# Patient Record
Sex: FEMALE | Race: WHITE | NOT HISPANIC OR LATINO | Employment: FULL TIME | URBAN - METROPOLITAN AREA
[De-identification: names, ages, dates, MRNs, and addresses within clinical notes are randomized per-mention and may not be internally consistent; named-entity substitution may affect disease eponyms.]

---

## 2019-08-07 ENCOUNTER — HISTORICAL (OUTPATIENT)
Dept: RADIOLOGY | Facility: HOSPITAL | Age: 59
End: 2019-08-07

## 2019-08-20 ENCOUNTER — HISTORICAL (OUTPATIENT)
Dept: RADIOLOGY | Facility: HOSPITAL | Age: 59
End: 2019-08-20

## 2022-08-07 ENCOUNTER — HOSPITAL ENCOUNTER (INPATIENT)
Facility: HOSPITAL | Age: 62
LOS: 3 days | Discharge: NURSING, REHAB OR LONG-TERM CARE | DRG: 494 | End: 2022-08-10
Attending: STUDENT IN AN ORGANIZED HEALTH CARE EDUCATION/TRAINING PROGRAM | Admitting: INTERNAL MEDICINE
Payer: MEDICAID

## 2022-08-07 DIAGNOSIS — S00.81XA FOREHEAD ABRASION, INITIAL ENCOUNTER: ICD-10-CM

## 2022-08-07 DIAGNOSIS — Z01.811 PRE-OP CHEST EXAM: ICD-10-CM
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
## 2022-08-07 DIAGNOSIS — M25.571 RIGHT ANKLE PAIN: ICD-10-CM

## 2022-08-07 DIAGNOSIS — S80.211A ABRASION OF RIGHT KNEE, INITIAL ENCOUNTER: ICD-10-CM

## 2022-08-07 DIAGNOSIS — F10.929 ALCOHOLIC INTOXICATION WITH COMPLICATION: ICD-10-CM

## 2022-08-07 DIAGNOSIS — S82.851A CLOSED TRIMALLEOLAR FRACTURE OF RIGHT ANKLE, INITIAL ENCOUNTER: Primary | ICD-10-CM

## 2022-08-07 PROBLEM — I10 HYPERTENSION: Status: ACTIVE | Noted: 2022-08-07

## 2022-08-07 PROBLEM — E78.5 HYPERLIPIDEMIA: Status: ACTIVE | Noted: 2022-08-07

## 2022-08-07 PROBLEM — I20.9 ANGINA PECTORIS: Status: ACTIVE | Noted: 2022-08-07

## 2022-08-07 LAB
ALBUMIN SERPL-MCNC: 4.1 GM/DL (ref 3.4–4.8)
ALBUMIN/GLOB SERPL: 1.3 RATIO (ref 1.1–2)
ALP SERPL-CCNC: 89 UNIT/L (ref 40–150)
ALT SERPL-CCNC: 39 UNIT/L (ref 0–55)
APTT PPP: 24.1 SECONDS (ref 23.2–33.7)
AST SERPL-CCNC: 22 UNIT/L (ref 5–34)
BASOPHILS # BLD AUTO: 0.04 X10(3)/MCL (ref 0–0.2)
BASOPHILS NFR BLD AUTO: 0.4 %
BILIRUBIN DIRECT+TOT PNL SERPL-MCNC: 0.4 MG/DL
BUN SERPL-MCNC: 11 MG/DL (ref 9.8–20.1)
CALCIUM SERPL-MCNC: 10.3 MG/DL (ref 8.4–10.2)
CHLORIDE SERPL-SCNC: 103 MMOL/L (ref 98–107)
CO2 SERPL-SCNC: 21 MMOL/L (ref 23–31)
CREAT SERPL-MCNC: 0.82 MG/DL (ref 0.55–1.02)
EOSINOPHIL # BLD AUTO: 0.24 X10(3)/MCL (ref 0–0.9)
EOSINOPHIL NFR BLD AUTO: 2.6 %
ERYTHROCYTE [DISTWIDTH] IN BLOOD BY AUTOMATED COUNT: 13.6 % (ref 11.5–17)
ETHANOL SERPL-MCNC: 283 MG/DL
GLOBULIN SER-MCNC: 3.1 GM/DL (ref 2.4–3.5)
GLUCOSE SERPL-MCNC: 146 MG/DL (ref 82–115)
HCT VFR BLD AUTO: 39 % (ref 37–47)
HGB BLD-MCNC: 12.8 GM/DL (ref 12–16)
IMM GRANULOCYTES # BLD AUTO: 0.12 X10(3)/MCL (ref 0–0.04)
IMM GRANULOCYTES NFR BLD AUTO: 1.3 %
INR BLD: 1.08 (ref 0–1.3)
LYMPHOCYTES # BLD AUTO: 3.72 X10(3)/MCL (ref 0.6–4.6)
LYMPHOCYTES NFR BLD AUTO: 39.6 %
MCH RBC QN AUTO: 31 PG (ref 27–31)
MCHC RBC AUTO-ENTMCNC: 32.8 MG/DL (ref 33–36)
MCV RBC AUTO: 94.4 FL (ref 80–94)
MONOCYTES # BLD AUTO: 0.66 X10(3)/MCL (ref 0.1–1.3)
MONOCYTES NFR BLD AUTO: 7 %
NEUTROPHILS # BLD AUTO: 4.6 X10(3)/MCL (ref 2.1–9.2)
NEUTROPHILS NFR BLD AUTO: 49.1 %
PLATELET # BLD AUTO: 336 X10(3)/MCL (ref 130–400)
PMV BLD AUTO: 8.5 FL (ref 7.4–10.4)
POTASSIUM SERPL-SCNC: 4.2 MMOL/L (ref 3.5–5.1)
PROT SERPL-MCNC: 7.2 GM/DL (ref 5.8–7.6)
PROTHROMBIN TIME: 13.9 SECONDS (ref 12.5–14.5)
RBC # BLD AUTO: 4.13 X10(6)/MCL (ref 4.2–5.4)
SARS-COV-2 RDRP RESP QL NAA+PROBE: NEGATIVE
SODIUM SERPL-SCNC: 136 MMOL/L (ref 136–145)
WBC # SPEC AUTO: 9.4 X10(3)/MCL (ref 4.5–11.5)

## 2022-08-07 PROCEDURE — 96376 TX/PRO/DX INJ SAME DRUG ADON: CPT

## 2022-08-07 PROCEDURE — 80053 COMPREHEN METABOLIC PANEL: CPT | Performed by: STUDENT IN AN ORGANIZED HEALTH CARE EDUCATION/TRAINING PROGRAM

## 2022-08-07 PROCEDURE — 36415 COLL VENOUS BLD VENIPUNCTURE: CPT | Performed by: STUDENT IN AN ORGANIZED HEALTH CARE EDUCATION/TRAINING PROGRAM

## 2022-08-07 PROCEDURE — 11000001 HC ACUTE MED/SURG PRIVATE ROOM

## 2022-08-07 PROCEDURE — 25000003 PHARM REV CODE 250: Performed by: INTERNAL MEDICINE

## 2022-08-07 PROCEDURE — 63600175 PHARM REV CODE 636 W HCPCS: Performed by: STUDENT IN AN ORGANIZED HEALTH CARE EDUCATION/TRAINING PROGRAM

## 2022-08-07 PROCEDURE — 96374 THER/PROPH/DIAG INJ IV PUSH: CPT

## 2022-08-07 PROCEDURE — 82077 ASSAY SPEC XCP UR&BREATH IA: CPT | Performed by: STUDENT IN AN ORGANIZED HEALTH CARE EDUCATION/TRAINING PROGRAM

## 2022-08-07 PROCEDURE — 85610 PROTHROMBIN TIME: CPT | Performed by: STUDENT IN AN ORGANIZED HEALTH CARE EDUCATION/TRAINING PROGRAM

## 2022-08-07 PROCEDURE — 87635 SARS-COV-2 COVID-19 AMP PRB: CPT | Performed by: STUDENT IN AN ORGANIZED HEALTH CARE EDUCATION/TRAINING PROGRAM

## 2022-08-07 PROCEDURE — 96375 TX/PRO/DX INJ NEW DRUG ADDON: CPT

## 2022-08-07 PROCEDURE — 85025 COMPLETE CBC W/AUTO DIFF WBC: CPT | Performed by: STUDENT IN AN ORGANIZED HEALTH CARE EDUCATION/TRAINING PROGRAM

## 2022-08-07 PROCEDURE — 94761 N-INVAS EAR/PLS OXIMETRY MLT: CPT

## 2022-08-07 PROCEDURE — 27000221 HC OXYGEN, UP TO 24 HOURS

## 2022-08-07 PROCEDURE — 99285 EMERGENCY DEPT VISIT HI MDM: CPT | Mod: 25

## 2022-08-07 PROCEDURE — A4216 STERILE WATER/SALINE, 10 ML: HCPCS | Performed by: INTERNAL MEDICINE

## 2022-08-07 PROCEDURE — 63600175 PHARM REV CODE 636 W HCPCS: Performed by: INTERNAL MEDICINE

## 2022-08-07 PROCEDURE — 29515 APPLICATION SHORT LEG SPLINT: CPT | Mod: RT

## 2022-08-07 PROCEDURE — 96361 HYDRATE IV INFUSION ADD-ON: CPT

## 2022-08-07 PROCEDURE — 63600175 PHARM REV CODE 636 W HCPCS: Performed by: SPECIALIST

## 2022-08-07 PROCEDURE — 85730 THROMBOPLASTIN TIME PARTIAL: CPT | Performed by: STUDENT IN AN ORGANIZED HEALTH CARE EDUCATION/TRAINING PROGRAM

## 2022-08-07 RX ORDER — ESCITALOPRAM OXALATE 20 MG/1
20 TABLET ORAL DAILY
COMMUNITY
Start: 2022-07-29

## 2022-08-07 RX ORDER — HYDROMORPHONE HYDROCHLORIDE 2 MG/ML
1 INJECTION, SOLUTION INTRAMUSCULAR; INTRAVENOUS; SUBCUTANEOUS
Status: DISCONTINUED | OUTPATIENT
Start: 2022-08-07 | End: 2022-08-10 | Stop reason: HOSPADM

## 2022-08-07 RX ORDER — MORPHINE SULFATE 4 MG/ML
INJECTION, SOLUTION INTRAMUSCULAR; INTRAVENOUS
Status: DISPENSED
Start: 2022-08-07 | End: 2022-08-07

## 2022-08-07 RX ORDER — ONDANSETRON 2 MG/ML
4 INJECTION INTRAMUSCULAR; INTRAVENOUS
Status: COMPLETED | OUTPATIENT
Start: 2022-08-07 | End: 2022-08-07

## 2022-08-07 RX ORDER — SODIUM CHLORIDE, SODIUM LACTATE, POTASSIUM CHLORIDE, CALCIUM CHLORIDE 600; 310; 30; 20 MG/100ML; MG/100ML; MG/100ML; MG/100ML
1000 INJECTION, SOLUTION INTRAVENOUS
Status: COMPLETED | OUTPATIENT
Start: 2022-08-07 | End: 2022-08-07

## 2022-08-07 RX ORDER — HYDROMORPHONE HYDROCHLORIDE 2 MG/ML
0.5 INJECTION, SOLUTION INTRAMUSCULAR; INTRAVENOUS; SUBCUTANEOUS
Status: COMPLETED | OUTPATIENT
Start: 2022-08-07 | End: 2022-08-07

## 2022-08-07 RX ORDER — LISINOPRIL 10 MG/1
10 TABLET ORAL DAILY
Status: DISCONTINUED | OUTPATIENT
Start: 2022-08-08 | End: 2022-08-10 | Stop reason: HOSPADM

## 2022-08-07 RX ORDER — DIPHENHYDRAMINE HCL 25 MG
50 TABLET ORAL NIGHTLY PRN
COMMUNITY

## 2022-08-07 RX ORDER — ATORVASTATIN CALCIUM 40 MG/1
40 TABLET, FILM COATED ORAL DAILY
Status: DISCONTINUED | OUTPATIENT
Start: 2022-08-08 | End: 2022-08-10 | Stop reason: HOSPADM

## 2022-08-07 RX ORDER — LISINOPRIL 10 MG/1
10 TABLET ORAL DAILY
COMMUNITY
Start: 2022-07-29

## 2022-08-07 RX ORDER — ESCITALOPRAM OXALATE 5 MG/1
20 TABLET ORAL DAILY
Status: DISCONTINUED | OUTPATIENT
Start: 2022-08-08 | End: 2022-08-10 | Stop reason: HOSPADM

## 2022-08-07 RX ORDER — ATORVASTATIN CALCIUM 40 MG/1
40 TABLET, FILM COATED ORAL DAILY
COMMUNITY
Start: 2022-07-29

## 2022-08-07 RX ORDER — PROCHLORPERAZINE EDISYLATE 5 MG/ML
10 INJECTION INTRAMUSCULAR; INTRAVENOUS
Status: COMPLETED | OUTPATIENT
Start: 2022-08-07 | End: 2022-08-07

## 2022-08-07 RX ORDER — PROPOFOL 10 MG/ML
100 VIAL (ML) INTRAVENOUS
Status: DISCONTINUED | OUTPATIENT
Start: 2022-08-07 | End: 2022-08-07

## 2022-08-07 RX ORDER — SODIUM CHLORIDE, SODIUM LACTATE, POTASSIUM CHLORIDE, CALCIUM CHLORIDE 600; 310; 30; 20 MG/100ML; MG/100ML; MG/100ML; MG/100ML
1000 INJECTION, SOLUTION INTRAVENOUS
Status: ACTIVE | OUTPATIENT
Start: 2022-08-07 | End: 2022-08-08

## 2022-08-07 RX ORDER — MORPHINE SULFATE 4 MG/ML
2 INJECTION, SOLUTION INTRAMUSCULAR; INTRAVENOUS
Status: DISCONTINUED | OUTPATIENT
Start: 2022-08-07 | End: 2022-08-07

## 2022-08-07 RX ORDER — MORPHINE SULFATE 4 MG/ML
4 INJECTION, SOLUTION INTRAMUSCULAR; INTRAVENOUS
Status: COMPLETED | OUTPATIENT
Start: 2022-08-07 | End: 2022-08-07

## 2022-08-07 RX ORDER — SODIUM CHLORIDE, SODIUM LACTATE, POTASSIUM CHLORIDE, CALCIUM CHLORIDE 600; 310; 30; 20 MG/100ML; MG/100ML; MG/100ML; MG/100ML
INJECTION, SOLUTION INTRAVENOUS CONTINUOUS
Status: DISCONTINUED | OUTPATIENT
Start: 2022-08-07 | End: 2022-08-09

## 2022-08-07 RX ORDER — ACETAMINOPHEN 325 MG/1
650 TABLET ORAL EVERY 8 HOURS PRN
Status: DISCONTINUED | OUTPATIENT
Start: 2022-08-07 | End: 2022-08-10 | Stop reason: HOSPADM

## 2022-08-07 RX ORDER — HYDROMORPHONE HYDROCHLORIDE 2 MG/ML
1 INJECTION, SOLUTION INTRAMUSCULAR; INTRAVENOUS; SUBCUTANEOUS EVERY 4 HOURS PRN
Status: DISCONTINUED | OUTPATIENT
Start: 2022-08-07 | End: 2022-08-10 | Stop reason: HOSPADM

## 2022-08-07 RX ORDER — DIPHENHYDRAMINE HCL 25 MG
25 CAPSULE ORAL NIGHTLY PRN
Status: DISCONTINUED | OUTPATIENT
Start: 2022-08-07 | End: 2022-08-10 | Stop reason: HOSPADM

## 2022-08-07 RX ORDER — SODIUM CHLORIDE 0.9 % (FLUSH) 0.9 %
10 SYRINGE (ML) INJECTION EVERY 8 HOURS
Status: DISCONTINUED | OUTPATIENT
Start: 2022-08-07 | End: 2022-08-10 | Stop reason: HOSPADM

## 2022-08-07 RX ORDER — KETOROLAC TROMETHAMINE 30 MG/ML
30 INJECTION, SOLUTION INTRAMUSCULAR; INTRAVENOUS EVERY 6 HOURS PRN
Status: DISPENSED | OUTPATIENT
Start: 2022-08-07 | End: 2022-08-10

## 2022-08-07 RX ORDER — HYDROMORPHONE HYDROCHLORIDE 2 MG/ML
1 INJECTION, SOLUTION INTRAMUSCULAR; INTRAVENOUS; SUBCUTANEOUS ONCE
Status: COMPLETED | OUTPATIENT
Start: 2022-08-07 | End: 2022-08-07

## 2022-08-07 RX ADMIN — SODIUM CHLORIDE, POTASSIUM CHLORIDE, SODIUM LACTATE AND CALCIUM CHLORIDE 1000 ML: 600; 310; 30; 20 INJECTION, SOLUTION INTRAVENOUS at 02:08

## 2022-08-07 RX ADMIN — PROCHLORPERAZINE EDISYLATE 10 MG: 5 INJECTION INTRAMUSCULAR; INTRAVENOUS at 10:08

## 2022-08-07 RX ADMIN — SODIUM CHLORIDE, PRESERVATIVE FREE 10 ML: 5 INJECTION INTRAVENOUS at 02:08

## 2022-08-07 RX ADMIN — MORPHINE SULFATE 4 MG: 4 INJECTION INTRAVENOUS at 02:08

## 2022-08-07 RX ADMIN — KETOROLAC TROMETHAMINE 30 MG: 30 INJECTION, SOLUTION INTRAMUSCULAR; INTRAVENOUS at 03:08

## 2022-08-07 RX ADMIN — ONDANSETRON 4 MG: 2 INJECTION INTRAMUSCULAR; INTRAVENOUS at 02:08

## 2022-08-07 RX ADMIN — HYDROMORPHONE HYDROCHLORIDE 1 MG: 2 INJECTION, SOLUTION INTRAMUSCULAR; INTRAVENOUS; SUBCUTANEOUS at 08:08

## 2022-08-07 RX ADMIN — HYDROMORPHONE HYDROCHLORIDE 1 MG: 2 INJECTION INTRAMUSCULAR; INTRAVENOUS; SUBCUTANEOUS at 05:08

## 2022-08-07 RX ADMIN — SODIUM CHLORIDE, POTASSIUM CHLORIDE, SODIUM LACTATE AND CALCIUM CHLORIDE 1000 ML: 600; 310; 30; 20 INJECTION, SOLUTION INTRAVENOUS at 03:08

## 2022-08-07 RX ADMIN — HYDROMORPHONE HYDROCHLORIDE 1 MG: 2 INJECTION INTRAMUSCULAR; INTRAVENOUS; SUBCUTANEOUS at 12:08

## 2022-08-07 RX ADMIN — DIPHENHYDRAMINE HYDROCHLORIDE 25 MG: 25 CAPSULE ORAL at 08:08

## 2022-08-07 RX ADMIN — HYDROMORPHONE HYDROCHLORIDE 0.5 MG: 2 INJECTION, SOLUTION INTRAMUSCULAR; INTRAVENOUS; SUBCUTANEOUS at 05:08

## 2022-08-07 RX ADMIN — HYDROMORPHONE HYDROCHLORIDE 1 MG: 2 INJECTION INTRAMUSCULAR; INTRAVENOUS; SUBCUTANEOUS at 08:08

## 2022-08-07 RX ADMIN — SODIUM CHLORIDE, POTASSIUM CHLORIDE, SODIUM LACTATE AND CALCIUM CHLORIDE: 600; 310; 30; 20 INJECTION, SOLUTION INTRAVENOUS at 08:08

## 2022-08-07 NOTE — HPI
63 yo female presents to the ED last night after sustaining a fall in which she did strike her head but no LOC and twisted her right ankle.  She was at the [employer] drinking Etoh.  On her way out she tripped and fell.  She was found to have a displaced trimalleolar fracture of the right ankle.  She was also intoxicated with a Etoh level of 283.  Pain is 9/10 in intensity with no relieving factors.  She denies any N/V/D/C.  No chest pain/SOB.  No fever/chills.  Orthopedics has been consulted and repair is scheduled for tomorrow.

## 2022-08-07 NOTE — ED NOTES
Pt entered ED via [facility] with reports of tripping and falling over curb tonight. Obvious deformity noted to R ankle with cassie splint in place. Ems reports giving 50mcg fentanyl in route. pts pain 10/10. Admits to etoh use tonight. Abrasion noted to L knee and above R eyebrow without bleeding. Denies LOC

## 2022-08-07 NOTE — CONSULTS
[facility] - Medical Surgical Unit  Orthopedics  Consult Note    Patient Name: Alicia Bhagat  MRN: [id number]  Admission Date: 8/7/2022  Hospital Length of Stay: 0 days  Attending Provider: Tavares Francisco MD  Primary Care Provider: Albaro Bonilla MD    Patient information was obtained from patient and ER records.     Consults  Subjective:     Principal Problem:Closed trimalleolar fracture of right ankle    Chief Complaint:   Chief Complaint   Patient presents with    Ankle Pain     States tripped and fell tonight. Obvious R ankle deformity with cassie splint in place. 50mcg fentanyl given in route. Abrasion to R knee and above R eye. Denies LOC        HPI: This is a 62-year-old female who injured her right ankle during a fall.  She states she was at [employer] listening to a band and was walking and fell with a twisting motion to the ankle.  She had immediate pain to the right ankle region and was unable to bear weight.  She was brought to the emergency department, examined and x-rayed and found to have a trimalleolar right ankle fracture.  Past medical history is positive for hypertension and hyperlipidemia.  She does have a history of having had some sort of back surgery in 2012.  She still occasionally has back pain.  She has an abrasion above her right eye but denies loss of consciousness during the fall.      Principal Problem:Closed trimalleolar fracture of right ankle    Principal Orthopedic Problem:  Right closed trimalleolar ankle fracture.     Interval History:  Patient has been admitted under the care of the hospitalist Dr. Lundy has been consulted for orthopedic intervention.    Review of patient's allergies indicates:   Allergen Reactions    Adhesive        Current Facility-Administered Medications   Medication    acetaminophen tablet 650 mg    HYDROmorphone (PF) injection 1 mg    ketorolac injection 30 mg    sodium chloride 0.9% flush 10 mL     Objective:     Vital Signs  "(Most Recent):  Temp: 97.8 °F (36.6 °C) (08/07/22 1200)  Pulse: 91 (08/07/22 1200)  Resp: (P) 18 (08/07/22 1254)  BP: 137/75 (08/07/22 1200)  SpO2: 98 % (08/07/22 1200) Vital Signs (24h Range):  Temp:  [97.8 °F (36.6 °C)-97.9 °F (36.6 °C)] 97.8 °F (36.6 °C)  Pulse:  [80-95] 91  Resp:  [12-20] (P) 18  SpO2:  [94 %-99 %] 98 %  BP: ()/() 137/75     Weight: 81.6 kg (180 lb)  Height: 5' 5" (165.1 cm)  Body mass index is 29.95 kg/m².      Intake/Output Summary (Last 24 hours) at 8/7/2022 1256  Last data filed at 8/7/2022 0330  Gross per 24 hour   Intake 1000 ml   Output --   Net 1000 ml       General    Vitals reviewed.  Constitutional: She is oriented to person, place, and time. She appears well-developed and well-nourished.   HENT:   Head: Normocephalic.   Abrasion above the right eye.   Eyes: EOM are normal. Pupils are equal, round, and reactive to light.   Neck: Neck supple.   Cardiovascular:  Normal rate.            Pulmonary/Chest: Effort normal. No respiratory distress.   Abdominal: Soft. She exhibits no distension. There is no abdominal tenderness.   Neurological: She is alert and oriented to person, place, and time.   Psychiatric: She has a normal mood and affect. Her behavior is normal.         Right Ankle/Foot Exam     Other   Sensation: normal    Comments:  She has a posterior lower leg splint to the right lower extremity.  Capillary refill is normal in the toes she appears to be neurovascularly intact to the distal extremity.  I did not take the splint down we will do so tomorrow prior to surgery.    Left Ankle/Foot Exam   Left ankle exam is normal.    Right Knee Exam   Right knee exam is normal.    Left Knee Exam     Comments:  She has an abrasion just below the patella anteriorly.  There is no tenderness to the knee and she has full range of motion.    Significant Labs: BMP:   Recent Labs   Lab 08/07/22  0218      K 4.2   CO2 21*   BUN 11.0   CREATININE 0.82   CALCIUM 10.3*     CBC: "   Recent Labs   Lab 08/07/22  0218   WBC 9.4   HGB 12.8   HCT 39.0        Urine Studies: No results for input(s): COLORU, APPEARANCEUA, PHUR, SPECGRAV, PROTEINUA, GLUCUA, KETONESU, BILIRUBINUA, OCCULTUA, NITRITE, UROBILINOGEN, LEUKOCYTESUR, RBCUA, WBCUA, BACTERIA, SQUAMEPITHEL, HYALINECASTS in the last 48 hours.    Invalid input(s): WRIGHTSUR  All pertinent labs within the past 24 hours have been reviewed.    Significant Imaging: I have reviewed all pertinent imaging results/findings.    Assessment/Plan:     * Closed trimalleolar fracture of right ankle  Right trimalleolar ankle fracture.  We will proceed with open reduction internal fixation of the right distal tibia and fibula.  Risks and benefits of the surgery were discussed with the patient and her family, questions were answered consents were signed.  We will put in a consult for case management as the patient does live alone and she and the family requesting placement postoperatively.  She will be nonweightbearing.  Will place the patient NPO after midnight.        Thank you for your consult. I will follow-up with patient. Please contact us if you have any additional questions.    MASSIEL Sarmiento  Orthopedics  [facility] - Medical Surgical Unit

## 2022-08-07 NOTE — ED PROVIDER NOTES
Encounter Date: 8/7/2022       History     Chief Complaint   Patient presents with    Ankle Pain     States tripped and fell tonight. Obvious R ankle deformity with mookie splint in place. 50mcg fentanyl given in route. Abrasion to R knee and above R eye. Denies LOC     The history is provided by the patient, a relative and the EMS personnel.        62-year-old female with a past medical history as delineated below presents to emergency department after trip and fall [place] tonight.  Patient states that she was walking when she tripped.  States she hit her head on this email as well as some right ankle pain.  Obvious deformity was noted by family members.  Patient was brought in by EMS with Mookie splint.  Patient states that she is having pain to the right ankle.  Denies any other pain.  States she did drink alcohol tonight.  Denies being on any blood thinners.    Review of patient's allergies indicates:   Allergen Reactions    Adhesive      Past Medical History:   Diagnosis Date    Depression     High cholesterol     Hypertension      Past Surgical History:   Procedure Laterality Date    BACK SURGERY       Family History   Problem Relation Age of Onset    Heart disease Mother     Coronary artery disease Mother     Lung cancer Father      Social History     Tobacco Use    Smoking status: Never Smoker    Smokeless tobacco: Never Used   Substance Use Topics    Alcohol use: Yes     Comment: Once a week     Review of Systems   Constitutional: Negative for fever.   Respiratory: Negative for cough and shortness of breath.    Cardiovascular: Negative for chest pain.   Gastrointestinal: Negative for abdominal pain.   Musculoskeletal: Positive for joint swelling.   Skin: Positive for wound.   Neurological: Negative for dizziness, syncope and headaches.   All other systems reviewed and are negative.      Physical Exam     Initial Vitals [08/07/22 0155]   BP Pulse Resp Temp SpO2   104/70 82 18 97.9 °F  (36.6 °C) 98 %      MAP       --         Physical Exam    Nursing note and vitals reviewed.  Constitutional: She appears well-developed and well-nourished. No distress.   Eyes: EOM are normal. Pupils are equal, round, and reactive to light.   Cardiovascular: Normal rate and regular rhythm.   Pulmonary/Chest: Breath sounds normal. No respiratory distress.   Abdominal: Abdomen is soft. Bowel sounds are normal. There is no abdominal tenderness.   Musculoskeletal:         General: Tenderness (Tenderness to the right ankle with an obvious deformity.) present.     Neurological: She is alert and oriented to person, place, and time. GCS score is 15. GCS eye subscore is 4. GCS verbal subscore is 5. GCS motor subscore is 6.   Skin: Skin is warm. Capillary refill takes less than 2 seconds.   Superficial abrasion to the right forehead and left knee   Psychiatric: She has a normal mood and affect. Thought content normal.         ED Course   Procedures     Orders Placed This Encounter    X-Ray Ankle Complete Right    CT Head Without Contrast    CT Cervical Spine Without Contrast    Comprehensive metabolic panel    CBC auto differential    APTT    Ethanol    CBC with Differential    Protime-INR    COVID-19 Rapid Screening    Admit to Inpatient    lactated ringers bolus 1,000 mL    ondansetron injection 4 mg    morphine 4 mg/mL injection    morphine injection 4 mg    lactated ringers infusion    HYDROmorphone (PF) injection 0.5 mg    HYDROmorphone (PF) injection 1 mg    prochlorperazine injection Soln 10 mg       Labs Reviewed   COMPREHENSIVE METABOLIC PANEL - Abnormal; Notable for the following components:       Result Value    Carbon Dioxide 21 (*)     Glucose Level 146 (*)     Calcium Level Total 10.3 (*)     All other components within normal limits   ALCOHOL,MEDICAL (ETHANOL) - Abnormal; Notable for the following components:    Ethanol Level 283.0 (*)     All other components within normal limits   CBC WITH  DIFFERENTIAL - Abnormal; Notable for the following components:    RBC 4.13 (*)     MCV 94.4 (*)     MCHC 32.8 (*)     IG# 0.12 (*)     All other components within normal limits   APTT - Normal   PROTIME-INR - Normal   SARS-COV-2 RNA AMPLIFICATION, QUAL - Normal   CBC W/ AUTO DIFFERENTIAL    Narrative:     The following orders were created for panel order CBC auto differential.  Procedure                               Abnormality         Status                     ---------                               -----------         ------                     CBC with Differential[[id number]]        Abnormal            Final result                 Please view results for these tests on the individual orders.     Admission on 08/07/2022   Component Date Value Ref Range Status    Sodium Level 08/07/2022 136  136 - 145 mmol/L Final    Potassium Level 08/07/2022 4.2  3.5 - 5.1 mmol/L Final    Chloride 08/07/2022 103  98 - 107 mmol/L Final    Carbon Dioxide 08/07/2022 21 (A) 23 - 31 mmol/L Final    Glucose Level 08/07/2022 146 (A) 82 - 115 mg/dL Final    Blood Urea Nitrogen 08/07/2022 11.0  9.8 - 20.1 mg/dL Final    Creatinine 08/07/2022 0.82  0.55 - 1.02 mg/dL Final    Calcium Level Total 08/07/2022 10.3 (A) 8.4 - 10.2 mg/dL Final    Protein Total 08/07/2022 7.2  5.8 - 7.6 gm/dL Final    Albumin Level 08/07/2022 4.1  3.4 - 4.8 gm/dL Final    Globulin 08/07/2022 3.1  2.4 - 3.5 gm/dL Final    Albumin/Globulin Ratio 08/07/2022 1.3  1.1 - 2.0 ratio Final    Bilirubin Total 08/07/2022 0.4  <=1.5 mg/dL Final    Alkaline Phosphatase 08/07/2022 89  40 - 150 unit/L Final    Alanine Aminotransferase 08/07/2022 39  0 - 55 unit/L Final    Aspartate Aminotransferase 08/07/2022 22  5 - 34 unit/L Final    Estimated GFR-Non  08/07/2022 >60  mls/min/1.73/m2 Final    PTT 08/07/2022 24.1  23.2 - 33.7 seconds Final    Ethanol Level 08/07/2022 283.0 (A) <=10.0 mg/dL Final    WBC 08/07/2022 9.4  4.5 - 11.5 x10(3)/mcL  Final    RBC 08/07/2022 4.13 (A) 4.20 - 5.40 x10(6)/mcL Final    Hgb 08/07/2022 12.8  12.0 - 16.0 gm/dL Final    Hct 08/07/2022 39.0  37.0 - 47.0 % Final    MCV 08/07/2022 94.4 (A) 80.0 - 94.0 fL Final    MCH 08/07/2022 31.0  27.0 - 31.0 pg Final    MCHC 08/07/2022 32.8 (A) 33.0 - 36.0 mg/dL Final    RDW 08/07/2022 13.6  11.5 - 17.0 % Final    Platelet 08/07/2022 336  130 - 400 x10(3)/mcL Final    MPV 08/07/2022 8.5  7.4 - 10.4 fL Final    Neut % 08/07/2022 49.1  % Final    Lymph % 08/07/2022 39.6  % Final    Mono % 08/07/2022 7.0  % Final    Eos % 08/07/2022 2.6  % Final    Basophil % 08/07/2022 0.4  % Final    Lymph # 08/07/2022 3.72  0.6 - 4.6 x10(3)/mcL Final    Neut # 08/07/2022 4.6  2.1 - 9.2 x10(3)/mcL Final    Mono # 08/07/2022 0.66  0.1 - 1.3 x10(3)/mcL Final    Eos # 08/07/2022 0.24  0 - 0.9 x10(3)/mcL Final    Baso # 08/07/2022 0.04  0 - 0.2 x10(3)/mcL Final    IG# 08/07/2022 0.12 (A) 0 - 0.04 x10(3)/mcL Final    IG% 08/07/2022 1.3  % Final    PT 08/07/2022 13.9  12.5 - 14.5 seconds Final    INR 08/07/2022 1.08  0.00 - 1.30 Final    SARS COV-2 MOLECULAR 08/07/2022 Negative  Negative Final          Imaging Results          X-Ray Ankle Complete Right (Preliminary result)  Result time 08/07/22 02:21:33    ED Interpretation by Daniel Jacobo MD (08/07/22 02:21:33, [facility] - Emergency Dept, Emergency Medicine)    Displaced trimalleolar fracture of the right ankle                             CT Cervical Spine Without Contrast (Preliminary result)  Result time 08/07/22 02:17:46    Preliminary result by Interface, Rad Results In (08/07/22 02:17:46)                 Narrative:    START OF REPORT:  Technique: CT of the cervical spine was performed without intravenous contrast with axial as well as sagittal and coronal images.    Comparison: None.    Dosage Information: Automated exposure control was utilized.    Clinical history: Fell hit head on ground, laceration to right  eye.    Findings:  Position: Supine.  Artifact: None.  Lung apices: The visualized lung apices appear unremarkable.  Spine:  Spinal canal: The spinal canal appears unremarkable.  Spinal cord: The spinal cord appears unremarkable.  Mineralization: Within normal limits for age.  Rotation: No significant rotation is seen.  Scoliosis: No significant scoliosis is seen.  Vertebral Fusion: No vertebral fusion is identified.  Listhesis: No significant listhesis is identified.  Lordosis: Straightening of the cervical lordosis is seen. This may reflect an element of myospasm.  Intervertebral disc spaces: Multilevel loss of disc height is seen.  Osteophytes: Moderate to pronounced multilevel endplate osteophytes are seen.  Endplate Sclerosis: Mild multilevel endplate sclerosis is seen.  Uncovertebral degenerative changes: Mild multilevel uncovertebral joint arthrosis is seen.  Facet degenerative changes: Mild multilevel facet degenerative changes are seen.  Calcifications: Medium sized calcifications are seen anterior to the C4-C5 through C7-T1.  Fractures: No acute cervical spine fracture dislocation or subluxation is seen.    Miscellaneous:  Soft Tissues: Unremarkable.      Impression:  1. No acute cervical spine fracture dislocation or subluxation is seen.  2. Degenerative changes and other details as above.                      Preliminary result by Andres Weldon MD (08/07/22 02:17:46)                 Narrative:    START OF REPORT:  Technique: CT of the cervical spine was performed without intravenous contrast with axial as well as sagittal and coronal images.    Comparison: None.    Dosage Information: Automated exposure control was utilized.    Clinical history: Fell hit head on ground, laceration to right eye.    Findings:  Position: Supine.  Artifact: None.  Lung apices: The visualized lung apices appear unremarkable.  Spine:  Spinal canal: The spinal canal appears unremarkable.  Spinal cord: The spinal cord appears  unremarkable.  Mineralization: Within normal limits for age.  Rotation: No significant rotation is seen.  Scoliosis: No significant scoliosis is seen.  Vertebral Fusion: No vertebral fusion is identified.  Listhesis: No significant listhesis is identified.  Lordosis: Straightening of the cervical lordosis is seen. This may reflect an element of myospasm.  Intervertebral disc spaces: Multilevel loss of disc height is seen.  Osteophytes: Moderate to pronounced multilevel endplate osteophytes are seen.  Endplate Sclerosis: Mild multilevel endplate sclerosis is seen.  Uncovertebral degenerative changes: Mild multilevel uncovertebral joint arthrosis is seen.  Facet degenerative changes: Mild multilevel facet degenerative changes are seen.  Calcifications: Medium sized calcifications are seen anterior to the C4-C5 through C7-T1.  Fractures: No acute cervical spine fracture dislocation or subluxation is seen.    Miscellaneous:  Soft Tissues: Unremarkable.      Impression:  1. No acute cervical spine fracture dislocation or subluxation is seen.  2. Degenerative changes and other details as above.                                 CT Head Without Contrast (Preliminary result)  Result time 08/07/22 02:16:18    Preliminary result by Interface, Rad Results In (08/07/22 02:16:18)                 Narrative:    START OF REPORT:  Technique: CT of the head was performed without intravenous contrast with axial as well as coronal and sagittal images.    Comparison: None.    Dosage Information: Automated exposure control was utilized.    Clinical history: Fell hit head on ground.    Findings:  Hemorrhage: No acute intracranial hemorrhage is seen.  CSF spaces: The ventricles sulci and basal cisterns are within normal limits.  Brain parenchyma: Moderate scattered microvascular change is seen in portions of the periventricular and deep white matter tracts.  Cerebellum: Unremarkable.  Vascular: Mild to moderate atheromatous calcification of  the intracranial arteries is seen.  Sella and skull base: The sella appears to be within normal limits for age.  Intracranial calcifications: Incidental note is made of bilateral choroid plexus calcification. Incidental note is made of some pineal region calcification.  Calvarium: No acute linear or depressed skull fracture is seen.    Maxillofacial Structures:  Paranasal sinuses: The visualized paranasal sinuses appear clear with no significant mucoperiosteal thickening or air fluid levels identified.  Orbits: The orbits appear unremarkable.  Zygomatic arches: The zygomatic arches are intact and unremarkable.  Temporal bones and mastoids: The temporal bones and mastoids appear unremarkable.  TMJ: The mandibular condyles appear normally placed with respect to the mandibular fossa.      Impression:  1. No acute intracranial process identified. Details and findings as noted above.                      Preliminary result by Andres Weldon MD (08/07/22 02:16:18)                 Narrative:    START OF REPORT:  Technique: CT of the head was performed without intravenous contrast with axial as well as coronal and sagittal images.    Comparison: None.    Dosage Information: Automated exposure control was utilized.    Clinical history: Fell hit head on ground.    Findings:  Hemorrhage: No acute intracranial hemorrhage is seen.  CSF spaces: The ventricles sulci and basal cisterns are within normal limits.  Brain parenchyma: Moderate scattered microvascular change is seen in portions of the periventricular and deep white matter tracts.  Cerebellum: Unremarkable.  Vascular: Mild to moderate atheromatous calcification of the intracranial arteries is seen.  Sella and skull base: The sella appears to be within normal limits for age.  Intracranial calcifications: Incidental note is made of bilateral choroid plexus calcification. Incidental note is made of some pineal region calcification.  Calvarium: No acute linear or depressed  skull fracture is seen.    Maxillofacial Structures:  Paranasal sinuses: The visualized paranasal sinuses appear clear with no significant mucoperiosteal thickening or air fluid levels identified.  Orbits: The orbits appear unremarkable.  Zygomatic arches: The zygomatic arches are intact and unremarkable.  Temporal bones and mastoids: The temporal bones and mastoids appear unremarkable.  TMJ: The mandibular condyles appear normally placed with respect to the mandibular fossa.      Impression:  1. No acute intracranial process identified. Details and findings as noted above.                                   Medications   lactated ringers bolus 1,000 mL (0 mLs Intravenous Stopped 8/7/22 0330)   ondansetron injection 4 mg (4 mg Intravenous Given 8/7/22 0254)   morphine injection 4 mg (4 mg Intravenous Given 8/7/22 0257)   lactated ringers infusion (1,000 mLs Intravenous New Bag 8/7/22 0332)   HYDROmorphone (PF) injection 0.5 mg (0.5 mg Intravenous Given 8/7/22 0545)   HYDROmorphone (PF) injection 1 mg (1 mg Intravenous Given 8/7/22 0829)   prochlorperazine injection Soln 10 mg (10 mg Intravenous Given 8/7/22 1039)     Medical Decision Making:   Differential Diagnosis:   Fracture, contusion, sprain, abrasion, head injury             ED Course as of 08/07/22 1125   Sun Aug 07, 2022   0246 Spoke with Dr. Lundy, orthopedic surgery, he reviewed the films and states to place patient in a splint without doing reduction and having her follow-up in his clinic on Monday.  He does not recommend admission. [BS]   0258 Patient is extremely intoxicated and at this time is unsafe to proceed with sedation.  Will splint the ankle like it currently is.  She is neurovascularly intact.  I will reassess later when patient is more so sober. [BS]   0318 Family members understand splinting her like she is right now is the safest option.  They understand that any type of procedure of sedation has a significant increase secondary to  patient's significant intoxication from alcohol. [BS]   0319 Alcohol, Serum(!): 283.0 [BS]   1120 I am Dr. Guzman I assumed the care of patient at 6:00 a.m., patient presented to the emergency room during the night with trimalleolar right ankle fracture, when I talked to the patient patient is in significant pain, she is wide awake now and I felt that she will not be able to take care of herself at home alone with trimalleolar displaced fracture of the ankle although it is close, so I decided to call the hospitalist to admit her in the hospital for pain management, recovery from her alcohol intoxication and since pt. Is in too much pain I talked to Dr. William, at 6:55 AM, says he is going off in 5 min and call the Day hospitalist in 10 minutes.  Called and talked to Dr. Francisco, says as long as Dr. Lundy wants to do the surgery, he can admit the patient.  Talked to Dr. Lundy at 11:10 AM, he says OK to admit and will do surgery in AM. [GQ]   1124 I am going to admit pt. For Dr. Francisco for pain management, Alcohol intoxication and Dr. Lundy will do surgery in AM. [GQ]      ED Course User Index  [BS] Daniel Jacobo MD  [GQ] Linda Guzman MD             Clinical Impression:   Final diagnoses:  [M25.571] Right ankle pain  [S82.851A] Closed trimalleolar fracture of right ankle, initial encounter (Primary)  [F10.929] Alcoholic intoxication with complication  [W19.XXXA] Fall, initial encounter  [S00.81XA] Forehead abrasion, initial encounter  [S80.211A] Abrasion of right knee, initial encounter          ED Disposition Condition    Admit               Linda Guzman MD  08/07/22 1120

## 2022-08-07 NOTE — SUBJECTIVE & OBJECTIVE
"Principal Problem:Closed trimalleolar fracture of right ankle    Principal Orthopedic Problem:  Right closed trimalleolar ankle fracture.     Interval History:  Patient has been admitted under the care of the hospitalist Dr. Lundy has been consulted for orthopedic intervention.    Review of patient's allergies indicates:   Allergen Reactions    Adhesive        Current Facility-Administered Medications   Medication    acetaminophen tablet 650 mg    HYDROmorphone (PF) injection 1 mg    ketorolac injection 30 mg    sodium chloride 0.9% flush 10 mL     Objective:     Vital Signs (Most Recent):  Temp: 97.8 °F (36.6 °C) (08/07/22 1200)  Pulse: 91 (08/07/22 1200)  Resp: (P) 18 (08/07/22 1254)  BP: 137/75 (08/07/22 1200)  SpO2: 98 % (08/07/22 1200) Vital Signs (24h Range):  Temp:  [97.8 °F (36.6 °C)-97.9 °F (36.6 °C)] 97.8 °F (36.6 °C)  Pulse:  [80-95] 91  Resp:  [12-20] (P) 18  SpO2:  [94 %-99 %] 98 %  BP: ()/() 137/75     Weight: 81.6 kg (180 lb)  Height: 5' 5" (165.1 cm)  Body mass index is 29.95 kg/m².      Intake/Output Summary (Last 24 hours) at 8/7/2022 1256  Last data filed at 8/7/2022 0330  Gross per 24 hour   Intake 1000 ml   Output --   Net 1000 ml       General    Vitals reviewed.  Constitutional: She is oriented to person, place, and time. She appears well-developed and well-nourished.   HENT:   Head: Normocephalic.   Abrasion above the right eye.   Eyes: EOM are normal. Pupils are equal, round, and reactive to light.   Neck: Neck supple.   Cardiovascular:  Normal rate.            Pulmonary/Chest: Effort normal. No respiratory distress.   Abdominal: Soft. She exhibits no distension. There is no abdominal tenderness.   Neurological: She is alert and oriented to person, place, and time.   Psychiatric: She has a normal mood and affect. Her behavior is normal.         Right Ankle/Foot Exam     Other   Sensation: normal    Comments:  She has a posterior lower leg splint to the right lower extremity.  " Capillary refill is normal in the toes she appears to be neurovascularly intact to the distal extremity.  I did not take the splint down we will do so tomorrow prior to surgery.    Left Ankle/Foot Exam   Left ankle exam is normal.    Right Knee Exam   Right knee exam is normal.    Left Knee Exam     Comments:  She has an abrasion just below the patella anteriorly.  There is no tenderness to the knee and she has full range of motion.    Significant Labs: BMP:   Recent Labs   Lab 08/07/22 0218      K 4.2   CO2 21*   BUN 11.0   CREATININE 0.82   CALCIUM 10.3*     CBC:   Recent Labs   Lab 08/07/22 0218   WBC 9.4   HGB 12.8   HCT 39.0        Urine Studies: No results for input(s): COLORU, APPEARANCEUA, PHUR, SPECGRAV, PROTEINUA, GLUCUA, KETONESU, BILIRUBINUA, OCCULTUA, NITRITE, UROBILINOGEN, LEUKOCYTESUR, RBCUA, WBCUA, BACTERIA, SQUAMEPITHEL, HYALINECASTS in the last 48 hours.    Invalid input(s): YESIKA  All pertinent labs within the past 24 hours have been reviewed.    Significant Imaging: I have reviewed all pertinent imaging results/findings.

## 2022-08-07 NOTE — HPI
This is a 62-year-old female who injured her right ankle during a fall.  She states she was at [employer] listening to a band and was walking and fell with a twisting motion to the ankle.  She had immediate pain to the right ankle region and was unable to bear weight.  She was brought to the emergency department, examined and x-rayed and found to have a trimalleolar right ankle fracture.  Past medical history is positive for hypertension and hyperlipidemia.  She does have a history of having had some sort of back surgery in 2012.  She still occasionally has back pain.  She has an abrasion above her right eye but denies loss of consciousness during the fall.

## 2022-08-07 NOTE — SUBJECTIVE & OBJECTIVE
Past Medical History:   Diagnosis Date    Closed trimalleolar fracture of right ankle 8/7/2022    Depression     High cholesterol     Hypertension        Past Surgical History:   Procedure Laterality Date    BACK SURGERY         Review of patient's allergies indicates:   Allergen Reactions    Adhesive        No current facility-administered medications on file prior to encounter.     Current Outpatient Medications on File Prior to Encounter   Medication Sig    diphenhydrAMINE (SOMINEX) 25 mg tablet Take 50 mg by mouth nightly as needed for Insomnia.     Family History       Problem Relation (Age of Onset)    Coronary artery disease Mother    Heart disease Mother    Lung cancer Father          Tobacco Use    Smoking status: Never Smoker    Smokeless tobacco: Never Used   Substance and Sexual Activity    Alcohol use: Yes     Comment: Once a week    Drug use: Not on file    Sexual activity: Not on file     Review of Systems   Constitutional: Negative.    HENT: Negative.     Eyes: Negative.    Respiratory: Negative.     Cardiovascular: Negative.    Gastrointestinal: Negative.    Endocrine: Negative.    Genitourinary: Negative.    Musculoskeletal:  Positive for arthralgias and joint swelling.   Skin:  Positive for wound.   Allergic/Immunologic: Negative.    Neurological: Negative.    Hematological: Negative.    Objective:     Vital Signs (Most Recent):  Temp: 97.8 °F (36.6 °C) (08/07/22 1334)  Pulse: 91 (08/07/22 1334)  Resp: 18 (08/07/22 1334)  BP: 137/75 (08/07/22 1334)  SpO2: 98 % (08/07/22 1334) Vital Signs (24h Range):  Temp:  [97.8 °F (36.6 °C)-97.9 °F (36.6 °C)] 97.8 °F (36.6 °C)  Pulse:  [80-95] 91  Resp:  [12-20] 18  SpO2:  [94 %-99 %] 98 %  BP: ()/() 137/75     Weight: 81.6 kg (180 lb)  Body mass index is 29.95 kg/m².    Physical Exam  Constitutional:       Appearance: Normal appearance. She is obese.   HENT:      Head: Normocephalic and atraumatic.      Nose: Nose normal.      Mouth/Throat:       Mouth: Mucous membranes are moist.      Pharynx: Oropharynx is clear.   Eyes:      Extraocular Movements: Extraocular movements intact and EOM normal.      Conjunctiva/sclera: Conjunctivae normal.      Pupils: Pupils are equal, round, and reactive to light.   Cardiovascular:      Rate and Rhythm: Normal rate and regular rhythm.      Pulses: Normal pulses.      Heart sounds: Normal heart sounds.   Pulmonary:      Effort: Pulmonary effort is normal.      Breath sounds: Normal breath sounds.   Abdominal:      General: Bowel sounds are normal.      Palpations: Abdomen is soft.   Musculoskeletal:         General: Swelling, tenderness and deformity present. Normal range of motion.      Cervical back: Normal range of motion and neck supple.   Skin:     General: Skin is warm and dry.      Capillary Refill: Capillary refill takes 2 to 3 seconds.      Findings: Lesion present.   Neurological:      General: No focal deficit present.      Mental Status: She is alert and oriented to person, place, and time. Mental status is at baseline.   Psychiatric:         Mood and Affect: Mood normal.         Behavior: Behavior normal.         Thought Content: Thought content normal.         Judgment: Judgment normal.         CRANIAL NERVES     CN I  cranial nerve I not tested    CN II   Visual fields full to confrontation.     CN III, IV, VI   Pupils are equal, round, and reactive to light.  Extraocular motions are normal.   CN III: no CN III palsy  CN VI: no CN VI palsy    CN V   Facial sensation intact.     CN VII   Facial expression full, symmetric.     CN VIII   CN VIII normal.     CN IX, X   CN IX normal.   CN X normal.     CN XI   CN XI normal.     CN XII   CN XII normal.      Significant Labs: All pertinent labs within the past 24 hours have been reviewed.  BMP:   Recent Labs   Lab 08/07/22 0218      K 4.2   CO2 21*   BUN 11.0   CREATININE 0.82   CALCIUM 10.3*     CBC:   Recent Labs   Lab 08/07/22 0218   WBC 9.4   HGB 12.8    HCT 39.0        CMP:   Recent Labs   Lab 08/07/22  0218      K 4.2   CO2 21*   BUN 11.0   CREATININE 0.82   CALCIUM 10.3*   ALBUMIN 4.1   BILITOT 0.4   ALKPHOS 89   AST 22   ALT 39   EGFRNONAA >60     Magnesium: No results for input(s): MG in the last 48 hours.    Significant Imaging: I have reviewed all pertinent imaging results/findings within the past 24 hours.

## 2022-08-07 NOTE — H&P
[facility]  History & Physical    Patient Name: Alicia Bhagat  MRN: [id number]  Patient Class: IP- Inpatient  Admission Date: 8/7/2022  Attending Physician: Tavares Francisco MD   Primary Care Provider: Albaro Bonilla MD         Patient information was obtained from patient and ER records.     Subjective:     Principal Problem:Closed trimalleolar fracture of right ankle    Chief Complaint:   Chief Complaint   Patient presents with    Ankle Pain     States tripped and fell tonight. Obvious R ankle deformity with cassie splint in place. 50mcg fentanyl given in route. Abrasion to R knee and above R eye. Denies LOC        HPI: 61 yo female presents to the ED last night after sustaining a fall in which she did strike her head but no LOC and twisted her right ankle.  She was at the [email or web address] drinking [employer].  On her way out she tripped and fell.  She was found to have a displaced trimalleolar fracture of the right ankle.  She was also intoxicated with a Etoh level of 283.  Pain is 9/10 in intensity with no relieving factors.  She denies any N/V/D/C.  No chest pain/SOB.  No fever/chills.  Orthopedics has been consulted and repair is scheduled for tomorrow.      Past Medical History:   Diagnosis Date    Closed trimalleolar fracture of right ankle 8/7/2022    Depression     High cholesterol     Hypertension        Past Surgical History:   Procedure Laterality Date    BACK SURGERY         Review of patient's allergies indicates:   Allergen Reactions    Adhesive        No current facility-administered medications on file prior to encounter.     Current Outpatient Medications on File Prior to Encounter   Medication Sig    diphenhydrAMINE (SOMINEX) 25 mg tablet Take 50 mg by mouth nightly as needed for Insomnia.     Family History       Problem Relation (Age of Onset)    Coronary artery disease Mother    Heart disease Mother    Lung cancer Father           Tobacco Use    Smoking status: Never Smoker    Smokeless tobacco: Never Used   Substance and Sexual Activity    Alcohol use: Yes     Comment: Once a week    Drug use: Not on file    Sexual activity: Not on file     Review of Systems   Constitutional: Negative.    HENT: Negative.     Eyes: Negative.    Respiratory: Negative.     Cardiovascular: Negative.    Gastrointestinal: Negative.    Endocrine: Negative.    Genitourinary: Negative.    Musculoskeletal:  Positive for arthralgias and joint swelling.   Skin:  Positive for wound.   Allergic/Immunologic: Negative.    Neurological: Negative.    Hematological: Negative.    Objective:     Vital Signs (Most Recent):  Temp: 97.8 °F (36.6 °C) (08/07/22 1334)  Pulse: 91 (08/07/22 1334)  Resp: 18 (08/07/22 1334)  BP: 137/75 (08/07/22 1334)  SpO2: 98 % (08/07/22 1334) Vital Signs (24h Range):  Temp:  [97.8 °F (36.6 °C)-97.9 °F (36.6 °C)] 97.8 °F (36.6 °C)  Pulse:  [80-95] 91  Resp:  [12-20] 18  SpO2:  [94 %-99 %] 98 %  BP: ()/() 137/75     Weight: 81.6 kg (180 lb)  Body mass index is 29.95 kg/m².    Physical Exam  Constitutional:       Appearance: Normal appearance. She is obese.   HENT:      Head: Normocephalic and atraumatic.      Nose: Nose normal.      Mouth/Throat:      Mouth: Mucous membranes are moist.      Pharynx: Oropharynx is clear.   Eyes:      Extraocular Movements: Extraocular movements intact and EOM normal.      Conjunctiva/sclera: Conjunctivae normal.      Pupils: Pupils are equal, round, and reactive to light.   Cardiovascular:      Rate and Rhythm: Normal rate and regular rhythm.      Pulses: Normal pulses.      Heart sounds: Normal heart sounds.   Pulmonary:      Effort: Pulmonary effort is normal.      Breath sounds: Normal breath sounds.   Abdominal:      General: Bowel sounds are normal.      Palpations: Abdomen is soft.   Musculoskeletal:         General: Swelling, tenderness and deformity present. Normal range of motion.       Cervical back: Normal range of motion and neck supple.   Skin:     General: Skin is warm and dry.      Capillary Refill: Capillary refill takes 2 to 3 seconds.      Findings: Lesion present.   Neurological:      General: No focal deficit present.      Mental Status: She is alert and oriented to person, place, and time. Mental status is at baseline.   Psychiatric:         Mood and Affect: Mood normal.         Behavior: Behavior normal.         Thought Content: Thought content normal.         Judgment: Judgment normal.         CRANIAL NERVES     CN I  cranial nerve I not tested    CN II   Visual fields full to confrontation.     CN III, IV, VI   Pupils are equal, round, and reactive to light.  Extraocular motions are normal.   CN III: no CN III palsy  CN VI: no CN VI palsy    CN V   Facial sensation intact.     CN VII   Facial expression full, symmetric.     CN VIII   CN VIII normal.     CN IX, X   CN IX normal.   CN X normal.     CN XI   CN XI normal.     CN XII   CN XII normal.      Significant Labs: All pertinent labs within the past 24 hours have been reviewed.  BMP:   Recent Labs   Lab 08/07/22 0218      K 4.2   CO2 21*   BUN 11.0   CREATININE 0.82   CALCIUM 10.3*     CBC:   Recent Labs   Lab 08/07/22 0218   WBC 9.4   HGB 12.8   HCT 39.0        CMP:   Recent Labs   Lab 08/07/22 0218      K 4.2   CO2 21*   BUN 11.0   CREATININE 0.82   CALCIUM 10.3*   ALBUMIN 4.1   BILITOT 0.4   ALKPHOS 89   AST 22   ALT 39   EGFRNONAA >60     Magnesium: No results for input(s): MG in the last 48 hours.    Significant Imaging: I have reviewed all pertinent imaging results/findings within the past 24 hours.    Assessment/Plan:     * Closed trimalleolar fracture of right ankle  Admit to inpatient  Orthopedics consulted  Pain control  Review home meds  Follow labs      Hypertension  Resume home meds        VTE Risk Mitigation (From admission, onward)         Ordered     IP VTE LOW RISK PATIENT  Once          08/07/22 1215     Place sequential compression device  Until discontinued         08/07/22 1215            Admit to inpatient  DVT prophylaxis  Follow labs  Pain control  Resume home meds  Orthopedics consulted       Tavares Francisco MD  Department of Hospital Medicine   [facility]

## 2022-08-07 NOTE — ASSESSMENT & PLAN NOTE
Right trimalleolar ankle fracture.  We will proceed with open reduction internal fixation of the right distal tibia and fibula.  Risks and benefits of the surgery were discussed with the patient and her family, questions were answered consents were signed.  We will put in a consult for case management as the patient does live alone and she and the family requesting placement postoperatively.  She will be nonweightbearing.  Will place the patient NPO after midnight.

## 2022-08-08 ENCOUNTER — ANESTHESIA EVENT (OUTPATIENT)
Dept: SURGERY | Facility: HOSPITAL | Age: 62
DRG: 494 | End: 2022-08-08
Payer: MEDICAID

## 2022-08-08 ENCOUNTER — ANESTHESIA (OUTPATIENT)
Dept: SURGERY | Facility: HOSPITAL | Age: 62
DRG: 494 | End: 2022-08-08
Payer: MEDICAID

## 2022-08-08 PROBLEM — I10 HYPERTENSION: Chronic | Status: ACTIVE | Noted: 2022-08-07

## 2022-08-08 LAB
ALBUMIN SERPL-MCNC: 3.4 GM/DL (ref 3.4–4.8)
ALBUMIN/GLOB SERPL: 1.5 RATIO (ref 1.1–2)
ALP SERPL-CCNC: 81 UNIT/L (ref 40–150)
ALT SERPL-CCNC: 31 UNIT/L (ref 0–55)
APPEARANCE UR: ABNORMAL
AST SERPL-CCNC: 21 UNIT/L (ref 5–34)
BASOPHILS # BLD AUTO: 0.04 X10(3)/MCL (ref 0–0.2)
BASOPHILS NFR BLD AUTO: 0.6 %
BILIRUB UR QL STRIP.AUTO: NEGATIVE MG/DL
BILIRUBIN DIRECT+TOT PNL SERPL-MCNC: 0.5 MG/DL
BUN SERPL-MCNC: 18 MG/DL (ref 9.8–20.1)
CALCIUM SERPL-MCNC: 9.6 MG/DL (ref 8.4–10.2)
CHLORIDE SERPL-SCNC: 104 MMOL/L (ref 98–107)
CO2 SERPL-SCNC: 25 MMOL/L (ref 23–31)
COLOR UR AUTO: YELLOW
CREAT SERPL-MCNC: 0.82 MG/DL (ref 0.55–1.02)
EOSINOPHIL # BLD AUTO: 0.2 X10(3)/MCL (ref 0–0.9)
EOSINOPHIL NFR BLD AUTO: 3 %
ERYTHROCYTE [DISTWIDTH] IN BLOOD BY AUTOMATED COUNT: 13.9 % (ref 11.5–17)
GFR SERPLBLD CREATININE-BSD FMLA CKD-EPI: >60 MLS/MIN/1.73/M2
GLOBULIN SER-MCNC: 2.3 GM/DL (ref 2.4–3.5)
GLUCOSE SERPL-MCNC: 114 MG/DL (ref 82–115)
GLUCOSE UR QL STRIP.AUTO: 100 MG/DL
HCT VFR BLD AUTO: 36.1 % (ref 37–47)
HGB BLD-MCNC: 11.5 GM/DL (ref 12–16)
IMM GRANULOCYTES # BLD AUTO: 0.07 X10(3)/MCL (ref 0–0.04)
IMM GRANULOCYTES NFR BLD AUTO: 1 %
KETONES UR QL STRIP.AUTO: NEGATIVE MG/DL
LEUKOCYTE ESTERASE UR QL STRIP.AUTO: NEGATIVE UNIT/L
LYMPHOCYTES # BLD AUTO: 1.61 X10(3)/MCL (ref 0.6–4.6)
LYMPHOCYTES NFR BLD AUTO: 23.9 %
MAGNESIUM SERPL-MCNC: 2.1 MG/DL (ref 1.6–2.6)
MCH RBC QN AUTO: 30.4 PG (ref 27–31)
MCHC RBC AUTO-ENTMCNC: 31.9 MG/DL (ref 33–36)
MCV RBC AUTO: 95.5 FL (ref 80–94)
MONOCYTES # BLD AUTO: 0.62 X10(3)/MCL (ref 0.1–1.3)
MONOCYTES NFR BLD AUTO: 9.2 %
NEUTROPHILS # BLD AUTO: 4.2 X10(3)/MCL (ref 2.1–9.2)
NEUTROPHILS NFR BLD AUTO: 62.3 %
NITRITE UR QL STRIP.AUTO: NEGATIVE
PH UR STRIP.AUTO: 5 [PH]
PLATELET # BLD AUTO: 284 X10(3)/MCL (ref 130–400)
PMV BLD AUTO: 8.8 FL (ref 7.4–10.4)
POTASSIUM SERPL-SCNC: 4.7 MMOL/L (ref 3.5–5.1)
PROT SERPL-MCNC: 5.7 GM/DL (ref 5.8–7.6)
PROT UR QL STRIP.AUTO: NEGATIVE MG/DL
RBC # BLD AUTO: 3.78 X10(6)/MCL (ref 4.2–5.4)
RBC UR QL AUTO: NEGATIVE UNIT/L
SODIUM SERPL-SCNC: 136 MMOL/L (ref 136–145)
SP GR UR STRIP.AUTO: 1.02
UROBILINOGEN UR STRIP-ACNC: 0.2 MG/DL
WBC # SPEC AUTO: 6.8 X10(3)/MCL (ref 4.5–11.5)

## 2022-08-08 PROCEDURE — 11000001 HC ACUTE MED/SURG PRIVATE ROOM

## 2022-08-08 PROCEDURE — 97161 PT EVAL LOW COMPLEX 20 MIN: CPT

## 2022-08-08 PROCEDURE — 94799 UNLISTED PULMONARY SVC/PX: CPT

## 2022-08-08 PROCEDURE — C1713 ANCHOR/SCREW BN/BN,TIS/BN: HCPCS | Performed by: SPECIALIST

## 2022-08-08 PROCEDURE — 81003 URINALYSIS AUTO W/O SCOPE: CPT | Performed by: SPECIALIST

## 2022-08-08 PROCEDURE — 63600175 PHARM REV CODE 636 W HCPCS: Performed by: SPECIALIST

## 2022-08-08 PROCEDURE — 25000003 PHARM REV CODE 250: Performed by: PHYSICIAN ASSISTANT

## 2022-08-08 PROCEDURE — 37000008 HC ANESTHESIA 1ST 15 MINUTES: Performed by: SPECIALIST

## 2022-08-08 PROCEDURE — 36415 COLL VENOUS BLD VENIPUNCTURE: CPT | Performed by: INTERNAL MEDICINE

## 2022-08-08 PROCEDURE — 93005 ELECTROCARDIOGRAM TRACING: CPT

## 2022-08-08 PROCEDURE — 27201423 OPTIME MED/SURG SUP & DEVICES STERILE SUPPLY: Performed by: SPECIALIST

## 2022-08-08 PROCEDURE — 63600175 PHARM REV CODE 636 W HCPCS: Performed by: NURSE ANESTHETIST, CERTIFIED REGISTERED

## 2022-08-08 PROCEDURE — 99900035 HC TECH TIME PER 15 MIN (STAT)

## 2022-08-08 PROCEDURE — 63600175 PHARM REV CODE 636 W HCPCS: Performed by: PHYSICIAN ASSISTANT

## 2022-08-08 PROCEDURE — 94761 N-INVAS EAR/PLS OXIMETRY MLT: CPT

## 2022-08-08 PROCEDURE — 80053 COMPREHEN METABOLIC PANEL: CPT | Performed by: INTERNAL MEDICINE

## 2022-08-08 PROCEDURE — 25000003 PHARM REV CODE 250: Performed by: SPECIALIST

## 2022-08-08 PROCEDURE — 85025 COMPLETE CBC W/AUTO DIFF WBC: CPT | Performed by: INTERNAL MEDICINE

## 2022-08-08 PROCEDURE — 71000033 HC RECOVERY, INTIAL HOUR: Performed by: SPECIALIST

## 2022-08-08 PROCEDURE — 63600175 PHARM REV CODE 636 W HCPCS: Performed by: INTERNAL MEDICINE

## 2022-08-08 PROCEDURE — A4216 STERILE WATER/SALINE, 10 ML: HCPCS | Performed by: PHYSICIAN ASSISTANT

## 2022-08-08 PROCEDURE — 25000003 PHARM REV CODE 250: Performed by: INTERNAL MEDICINE

## 2022-08-08 PROCEDURE — 83735 ASSAY OF MAGNESIUM: CPT | Performed by: INTERNAL MEDICINE

## 2022-08-08 PROCEDURE — 36000708 HC OR TIME LEV III 1ST 15 MIN: Performed by: SPECIALIST

## 2022-08-08 PROCEDURE — 36000709 HC OR TIME LEV III EA ADD 15 MIN: Performed by: SPECIALIST

## 2022-08-08 PROCEDURE — 37000009 HC ANESTHESIA EA ADD 15 MINS: Performed by: SPECIALIST

## 2022-08-08 DEVICE — SCREW CANCELLOUS FT 4MM X 16MM: Type: IMPLANTABLE DEVICE | Site: ANKLE | Status: FUNCTIONAL

## 2022-08-08 DEVICE — SCREW CANCELLOUS 4MMX 18MM: Type: IMPLANTABLE DEVICE | Site: ANKLE | Status: FUNCTIONAL

## 2022-08-08 DEVICE — SCREW CORTEX 2.7 X 22: Type: IMPLANTABLE DEVICE | Site: ANKLE | Status: FUNCTIONAL

## 2022-08-08 DEVICE — IMPLANTABLE DEVICE: Type: IMPLANTABLE DEVICE | Site: ANKLE | Status: FUNCTIONAL

## 2022-08-08 DEVICE — SCREW CORTEX 3.5MM X 12MM: Type: IMPLANTABLE DEVICE | Site: ANKLE | Status: FUNCTIONAL

## 2022-08-08 DEVICE — SCREW CORTEX 3.5MM X 14MM.: Type: IMPLANTABLE DEVICE | Site: ANKLE | Status: FUNCTIONAL

## 2022-08-08 RX ORDER — FENTANYL CITRATE 50 UG/ML
INJECTION, SOLUTION INTRAMUSCULAR; INTRAVENOUS
Status: DISCONTINUED | OUTPATIENT
Start: 2022-08-08 | End: 2022-08-08

## 2022-08-08 RX ORDER — MORPHINE SULFATE 10 MG/ML
4 INJECTION INTRAMUSCULAR; INTRAVENOUS; SUBCUTANEOUS EVERY 4 HOURS PRN
Status: CANCELLED | OUTPATIENT
Start: 2022-08-08

## 2022-08-08 RX ORDER — HYDROCODONE BITARTRATE AND ACETAMINOPHEN 10; 325 MG/1; MG/1
1 TABLET ORAL EVERY 6 HOURS PRN
Qty: 28 TABLET | Refills: 0 | Status: SHIPPED | OUTPATIENT
Start: 2022-08-08

## 2022-08-08 RX ORDER — MORPHINE SULFATE 10 MG/ML
2 INJECTION INTRAMUSCULAR; INTRAVENOUS; SUBCUTANEOUS
Status: CANCELLED | OUTPATIENT
Start: 2022-08-08

## 2022-08-08 RX ORDER — CEFAZOLIN SODIUM 2 G/50ML
2 SOLUTION INTRAVENOUS ONCE
Status: COMPLETED | OUTPATIENT
Start: 2022-08-08 | End: 2022-08-08

## 2022-08-08 RX ORDER — ONDANSETRON 2 MG/ML
4 INJECTION INTRAMUSCULAR; INTRAVENOUS EVERY 12 HOURS PRN
Status: CANCELLED | OUTPATIENT
Start: 2022-08-08

## 2022-08-08 RX ORDER — ASPIRIN 325 MG
325 TABLET, DELAYED RELEASE (ENTERIC COATED) ORAL DAILY
Qty: 42 TABLET | Refills: 0 | Status: SHIPPED | OUTPATIENT
Start: 2022-08-08 | End: 2022-09-19

## 2022-08-08 RX ORDER — BUPIVACAINE HYDROCHLORIDE 5 MG/ML
INJECTION, SOLUTION EPIDURAL; INTRACAUDAL
Status: DISCONTINUED | OUTPATIENT
Start: 2022-08-08 | End: 2022-08-08 | Stop reason: HOSPADM

## 2022-08-08 RX ORDER — MORPHINE SULFATE 10 MG/ML
4 INJECTION INTRAMUSCULAR; INTRAVENOUS; SUBCUTANEOUS EVERY 4 HOURS PRN
Status: DISCONTINUED | OUTPATIENT
Start: 2022-08-08 | End: 2022-08-10 | Stop reason: HOSPADM

## 2022-08-08 RX ORDER — ONDANSETRON 2 MG/ML
4 INJECTION INTRAMUSCULAR; INTRAVENOUS EVERY 12 HOURS PRN
Status: DISCONTINUED | OUTPATIENT
Start: 2022-08-08 | End: 2022-08-10 | Stop reason: HOSPADM

## 2022-08-08 RX ORDER — PHENYLEPHRINE HYDROCHLORIDE 10 MG/ML
INJECTION INTRAVENOUS
Status: DISCONTINUED | OUTPATIENT
Start: 2022-08-08 | End: 2022-08-08

## 2022-08-08 RX ORDER — CEFADROXIL 500 MG/1
500 CAPSULE ORAL EVERY 12 HOURS
Qty: 20 CAPSULE | Refills: 0 | Status: SHIPPED | OUTPATIENT
Start: 2022-08-08 | End: 2022-08-18

## 2022-08-08 RX ORDER — OXYCODONE HYDROCHLORIDE 5 MG/1
10 TABLET ORAL EVERY 6 HOURS PRN
Status: DISCONTINUED | OUTPATIENT
Start: 2022-08-08 | End: 2022-08-10 | Stop reason: HOSPADM

## 2022-08-08 RX ORDER — IBUPROFEN 600 MG/1
600 TABLET ORAL EVERY 6 HOURS PRN
Status: CANCELLED | OUTPATIENT
Start: 2022-08-08

## 2022-08-08 RX ORDER — ONDANSETRON 4 MG/1
8 TABLET, ORALLY DISINTEGRATING ORAL EVERY 8 HOURS PRN
Status: DISCONTINUED | OUTPATIENT
Start: 2022-08-08 | End: 2022-08-10 | Stop reason: HOSPADM

## 2022-08-08 RX ORDER — MIDAZOLAM HYDROCHLORIDE 1 MG/ML
INJECTION INTRAMUSCULAR; INTRAVENOUS
Status: DISCONTINUED | OUTPATIENT
Start: 2022-08-08 | End: 2022-08-08

## 2022-08-08 RX ORDER — HYDROMORPHONE HYDROCHLORIDE 2 MG/ML
0.2 INJECTION, SOLUTION INTRAMUSCULAR; INTRAVENOUS; SUBCUTANEOUS EVERY 5 MIN PRN
Status: CANCELLED | OUTPATIENT
Start: 2022-08-08

## 2022-08-08 RX ORDER — TRAMADOL HYDROCHLORIDE 50 MG/1
50 TABLET ORAL EVERY 6 HOURS PRN
Status: DISCONTINUED | OUTPATIENT
Start: 2022-08-08 | End: 2022-08-10 | Stop reason: HOSPADM

## 2022-08-08 RX ORDER — ONDANSETRON 4 MG/1
8 TABLET, ORALLY DISINTEGRATING ORAL EVERY 8 HOURS PRN
Status: CANCELLED | OUTPATIENT
Start: 2022-08-08

## 2022-08-08 RX ADMIN — HYDROMORPHONE HYDROCHLORIDE 1 MG: 2 INJECTION INTRAMUSCULAR; INTRAVENOUS; SUBCUTANEOUS at 03:08

## 2022-08-08 RX ADMIN — HYDROMORPHONE HYDROCHLORIDE 1 MG: 2 INJECTION INTRAMUSCULAR; INTRAVENOUS; SUBCUTANEOUS at 07:08

## 2022-08-08 RX ADMIN — MIDAZOLAM HYDROCHLORIDE 1 MG: 1 INJECTION, SOLUTION INTRAMUSCULAR; INTRAVENOUS at 12:08

## 2022-08-08 RX ADMIN — ESCITALOPRAM 20 MG: 5 TABLET, FILM COATED ORAL at 03:08

## 2022-08-08 RX ADMIN — ATORVASTATIN CALCIUM 40 MG: 40 TABLET, FILM COATED ORAL at 03:08

## 2022-08-08 RX ADMIN — HYDROMORPHONE HYDROCHLORIDE 1 MG: 2 INJECTION INTRAMUSCULAR; INTRAVENOUS; SUBCUTANEOUS at 09:08

## 2022-08-08 RX ADMIN — HYDROMORPHONE HYDROCHLORIDE 1 MG: 2 INJECTION INTRAMUSCULAR; INTRAVENOUS; SUBCUTANEOUS at 10:08

## 2022-08-08 RX ADMIN — SODIUM CHLORIDE, POTASSIUM CHLORIDE, SODIUM LACTATE AND CALCIUM CHLORIDE: 600; 310; 30; 20 INJECTION, SOLUTION INTRAVENOUS at 09:08

## 2022-08-08 RX ADMIN — LISINOPRIL 10 MG: 10 TABLET ORAL at 09:08

## 2022-08-08 RX ADMIN — KETOROLAC TROMETHAMINE 30 MG: 30 INJECTION, SOLUTION INTRAMUSCULAR; INTRAVENOUS at 06:08

## 2022-08-08 RX ADMIN — SODIUM CHLORIDE, POTASSIUM CHLORIDE, SODIUM LACTATE AND CALCIUM CHLORIDE: 600; 310; 30; 20 INJECTION, SOLUTION INTRAVENOUS at 07:08

## 2022-08-08 RX ADMIN — PHENYLEPHRINE HYDROCHLORIDE 100 MCG: 10 INJECTION INTRAVENOUS at 12:08

## 2022-08-08 RX ADMIN — KETOROLAC TROMETHAMINE 30 MG: 30 INJECTION, SOLUTION INTRAMUSCULAR; INTRAVENOUS at 04:08

## 2022-08-08 RX ADMIN — SODIUM CHLORIDE, PRESERVATIVE FREE 10 ML: 5 INJECTION INTRAVENOUS at 04:08

## 2022-08-08 RX ADMIN — MIDAZOLAM HYDROCHLORIDE 2 MG: 1 INJECTION, SOLUTION INTRAMUSCULAR; INTRAVENOUS at 11:08

## 2022-08-08 RX ADMIN — MIDAZOLAM HYDROCHLORIDE 0.5 MG: 1 INJECTION, SOLUTION INTRAMUSCULAR; INTRAVENOUS at 12:08

## 2022-08-08 RX ADMIN — DIPHENHYDRAMINE HYDROCHLORIDE 25 MG: 25 CAPSULE ORAL at 09:08

## 2022-08-08 RX ADMIN — ONDANSETRON 4 MG: 2 INJECTION INTRAMUSCULAR; INTRAVENOUS at 09:08

## 2022-08-08 RX ADMIN — OXYCODONE HYDROCHLORIDE 10 MG: 5 TABLET ORAL at 03:08

## 2022-08-08 RX ADMIN — SODIUM CHLORIDE, POTASSIUM CHLORIDE, SODIUM LACTATE AND CALCIUM CHLORIDE: 600; 310; 30; 20 INJECTION, SOLUTION INTRAVENOUS at 12:08

## 2022-08-08 RX ADMIN — CEFAZOLIN SODIUM 2 G: 2 SOLUTION INTRAVENOUS at 11:08

## 2022-08-08 RX ADMIN — FENTANYL CITRATE 50 MCG: 50 INJECTION INTRAMUSCULAR; INTRAVENOUS at 11:08

## 2022-08-08 NOTE — ANESTHESIA PROCEDURE NOTES
Spinal    Diagnosis: fractured right ankle  Patient location during procedure: OR  Start time: 8/8/2022 12:56 PM  Timeout: 8/8/2022 12:55 PM  End time: 8/8/2022 12:58 PM    Staffing  Authorizing Provider: Taylor Souza CRNA  Performing Provider: Taylor Souza CRNA    Preanesthetic Checklist  Completed: patient identified, IV checked, site marked, risks and benefits discussed, surgical consent, monitors and equipment checked, pre-op evaluation and timeout performed  Spinal Block  Patient position: sitting  Prep: Betadine  Patient monitoring: heart rate, cardiac monitor, continuous pulse ox and frequent blood pressure checks  Approach: midline  Location: L3-4  Injection technique: single shot  CSF Fluid: clear free-flowing CSF  Needle  Needle type: pencil-tip   Needle gauge: 24 G  Needle length: 3.5 in  Additional Documentation: no paresthesia on injection and negative aspiration for heme  Needle localization: anatomical landmarks  Assessment  Sensory level: T8   Dermatomal levels determined by pinch or prick  Ease of block: easy  Patient's tolerance of the procedure: comfortable throughout block and no complaints  Additional Notes  Marcaine 0.75% 1.3ml injected

## 2022-08-08 NOTE — PT/OT/SLP EVAL
Physical Therapy Evaluation    Patient Name:  Alicia Bhagat   MRN:  [id number]    Recommendations:     Discharge Recommendations:  rehabilitation facility   Discharge Equipment Recommendations: walker, standard, wheelchair   Barriers to discharge: None    Assessment:     Alicia Bhagat is a 62 y.o. female admitted with a medical diagnosis of Closed trimalleolar fracture of right ankle.  She presents with the following impairments/functional limitations:  weakness, impaired endurance, impaired balance, impaired functional mobility, decreased lower extremity function.    Pt found in bed and denied pain or discomfort. She was apprehensive to move so soon after surgery and stated she still felt numb. Therapist assured her and gave maximum encouragement. She then stood to a RW with Mod A and was able to pivot to a chair with good tolerance and demonstrated good understanding of WBS. She was unsafe to attempt ambulation at this time and will likely be more appropriate for ambulation with a wheelchair due to weakness. She is a great candidate for a rehabilitation to help return her to her previous independent level of function.    Rehab Prognosis: Good; patient would benefit from acute skilled PT services to address these deficits and reach maximum level of function.    Recent Surgery: Procedure(s) (LRB):  ORIF, ANKLE (Right) Day of Surgery    Plan:     During this hospitalization, patient to be seen BID to address the identified rehab impairments via therapeutic activities, therapeutic exercises, wheelchair management/training, gait training and progress toward the following goals:    · Plan of Care Expires:  09/05/22    Subjective     Chief Complaint: fear of falling due to weakness  Patient/Family Comments/goals: To return to PLOF  Pain/Comfort:  · Pain Rating 1: 0/10    Patients cultural, spiritual, Restoration conflicts given the current situation:      Living Environment:  Pt lives in [address] home with no steps to  enter  Prior to admission, patients level of function was Mod Independent.  Equipment used at home: grab bar, shower chair.     Objective:     Communicated with patient prior to session.  Patient found HOB elevated with peripheral IV, SCD, blood pressure cuff, oxygen  upon PT entry to room.    General Precautions: Standard, fall   Orthopedic Precautions:RLE non weight bearing   Braces:    Respiratory Status: Room air    Exams:  · RLE Strength: Deficits: 2/5  · LLE ROM: WFL  · LLE Strength: Deficits: 3/5    Functional Mobility:  · Bed Mobility:     · Scooting: minimum assistance  · Supine to Sit: minimum assistance  · Transfers:     · Sit to Stand:  moderate assistance with rolling walker  · Bed to Chair: moderate assistance with  rolling walker  using  Stand Pivot  · Balance: Min A to maintain static standing at RW    Therapeutic Activities and Exercises:   see above  Quad sets x 20 reps each leg    AM-PAC 6 CLICK MOBILITY  Total Score:      Patient left up in chair with all lines intact, call button in reach, nursing notified and family present.    GOALS:   Multidisciplinary Problems     Physical Therapy Goals        Problem: Physical Therapy    Goal Priority Disciplines Outcome Goal Variances Interventions   Physical Therapy Goal     PT, PT/OT Ongoing, Progressing     Description: Goals to be met by: 22     Patient will increase functional independence with mobility by performin. Supine to sit with Contact Guard Assistance  2. Sit to stand transfer with Contact Guard Assistance  3. Wheelchair propulsion x500 feet with Supervision using bilateral uppper extremities                     History:     Past Medical History:   Diagnosis Date    Closed trimalleolar fracture of right ankle 2022    Depression     High cholesterol     Hypertension        Past Surgical History:   Procedure Laterality Date    BACK SURGERY         Time Tracking:     PT Received On: 22  PT Start Time: 1350     PT Stop  Time: 1410  PT Total Time (min): 20 min     Billable Minutes: Evaluation 20 08/08/2022

## 2022-08-08 NOTE — PLAN OF CARE
Niece (Karyna) and other family members present at bedside. They would like patient to go to [facility]. Referral will be sent through [facility].

## 2022-08-08 NOTE — ASSESSMENT & PLAN NOTE
Right trimalleolar ankle fracture.  Patient underwent open reduction internal fixation without complications.  Continue current medical management.  Patient is nonweightbearing, PT for evaluation and mobility.  Awaiting rehab placement.  Follow-up with Dr. Lundy 2 weeks postoperatively.

## 2022-08-08 NOTE — TRANSFER OF CARE
"Anesthesia Transfer of Care Note    Patient: Alicia Bhagat    Procedure(s) Performed: Procedure(s) (LRB):  ORIF, ANKLE (Right)    Patient location: PACU    Anesthesia Type: spinal    Transport from OR: Transported from OR on room air with adequate spontaneous ventilation    Post pain: adequate analgesia    Post assessment: no apparent anesthetic complications    Post vital signs: stable    Level of consciousness: awake    Nausea/Vomiting: no nausea/vomiting    Complications: none    Transfer of care protocol was followed      Last vitals:   Visit Vitals  BP (!) 144/80   Pulse 79   Temp 36.7 °C (98.1 °F) (Oral)   Resp 14   Ht 5' 5" (1.651 m)   Wt 81.6 kg (180 lb)   SpO2 (!) 94%   BMI 29.95 kg/m²     "

## 2022-08-08 NOTE — ANESTHESIA PREPROCEDURE EVALUATION
08/08/2022  Alicia Bhagat is a 62 y.o., female.      Pre-op Assessment    I have reviewed the Patient Summary Reports.    I have reviewed the NPO Status.   I have reviewed the Medications.     Review of Systems  Anesthesia Hx:  No problems with previous Anesthesia  Neg history of prior surgery. Denies Family Hx of Anesthesia complications.   Denies Personal Hx of Anesthesia complications.   Social:  Non-Smoker    Hematology/Oncology:  Hematology Normal   Oncology Normal     EENT/Dental:EENT/Dental Normal   Cardiovascular:   Hypertension Angina hyperlipidemia    Pulmonary:  Pulmonary Normal    Renal/:  Renal/ Normal     Hepatic/GI:  Hepatic/GI Normal    Musculoskeletal:  Musculoskeletal Normal    Neurological:  Neurology Normal    Endocrine:  Obesity / BMI > 30  Dermatological:  Skin Normal    Psych:   Psychiatric History depression          Physical Exam  General: Cooperative, Alert, Well nourished and Oriented    Airway:  Mallampati: II   Mouth Opening: Normal  TM Distance: Normal  Neck ROM: Normal ROM    Dental:  Intact        Anesthesia Plan  Type of Anesthesia, risks & benefits discussed:    Anesthesia Type: Spinal, MAC  Intra-op Monitoring Plan: Standard ASA Monitors  Post Op Pain Control Plan: multimodal analgesia  Induction:  IV  Informed Consent: Informed consent signed with the Patient and all parties understand the risks and agree with anesthesia plan.  All questions answered. Patient consented to blood products? Yes  ASA Score: 2  Day of Surgery Review of History & Physical: I have interviewed and examined the patient. I have reviewed the patient's H&P dated: There are no significant changes.     Ready For Surgery From Anesthesia Perspective.     .

## 2022-08-08 NOTE — INTERVAL H&P NOTE
The patient has been examined and the H&P has been reviewed:    I concur with the findings and no changes have occurred since H&P was written.    Surgery risks, benefits and alternative options discussed and understood by patient/family.          Active Hospital Problems    Diagnosis  POA    *Closed trimalleolar fracture of right ankle [S82.851A]  Yes    Hypertension [I10]  Yes      Resolved Hospital Problems   No resolved problems to display.

## 2022-08-08 NOTE — PROGRESS NOTES
"[facility]  Orthopedics  Progress Note    Patient Name: Alicia Bhagat  MRN: [id number]  Admission Date: 8/7/2022  Hospital Length of Stay: 1 days  Attending Provider: Ranjeet Tamayo MD  Primary Care Provider: Albaro Bonilla MD  Follow-up For: Procedure(s) (LRB):  ORIF, ANKLE (Right)    Post-Operative Day: Day of Surgery  Subjective:     Principal Problem:Closed trimalleolar fracture of right ankle    Principal Orthopedic Problem:  Closed trimalleolar fracture.    Interval History:  Patient underwent ORIF of the right distal tibia and fibula with placement of plate and screws in the fibula and screws in the tibia.  After surgery posterior lower leg splint using plaster was applied.  Patient is awaiting rehab placement.  She and the family have chosen [facility].  Referral has been sent.  Patient will be nonweightbearing to the right lower extremity.  Case Management will get with the patient regarding DME needs.    Review of patient's allergies indicates:   Allergen Reactions    Adhesive        Current Facility-Administered Medications   Medication    acetaminophen tablet 650 mg    atorvastatin tablet 40 mg    diphenhydrAMINE capsule 25 mg    EScitalopram oxalate tablet 20 mg    HYDROmorphone (PF) injection 1 mg    HYDROmorphone (PF) injection 1 mg    ketorolac injection 30 mg    lactated ringers infusion    lisinopriL tablet 10 mg    sodium chloride 0.9% flush 10 mL    traMADoL tablet 50 mg     Objective:     Vital Signs (Most Recent):  Temp: 97.5 °F (36.4 °C) (08/08/22 1310)  Pulse: 85 (08/08/22 1310)  Resp: 14 (08/08/22 1055)  BP: (!) 107/55 (08/08/22 1310)  SpO2: 97 % (08/08/22 1310)   Vital Signs (24h Range):  Temp:  [97.5 °F (36.4 °C)-98.2 °F (36.8 °C)] 97.5 °F (36.4 °C)  Pulse:  [] 85  Resp:  [14-20] 14  SpO2:  [94 %-98 %] 97 %  BP: (107-171)/(55-95) 107/55     Weight: 81.6 kg (180 lb)  Height: 5' 5" (165.1 cm)  Body mass index is 29.95 " kg/m².      Intake/Output Summary (Last 24 hours) at 8/8/2022 1317  Last data filed at 8/8/2022 1305  Gross per 24 hour   Intake 1300 ml   Output --   Net 1300 ml       General    Vitals reviewed.  Constitutional: She is oriented to person, place, and time. She appears well-developed and well-nourished.   HENT:   Head: Normocephalic.   Nose: Nose normal.   Abrasion above the right eye   Eyes: EOM are normal. Pupils are equal, round, and reactive to light.   Neck: Neck supple.   Cardiovascular:  Normal rate.            Pulmonary/Chest: Effort normal. No respiratory distress.   Abdominal: Soft. There is no abdominal tenderness.   Neurological: She is alert and oriented to person, place, and time.   Psychiatric: She has a normal mood and affect. Her behavior is normal.         Right Ankle/Foot Exam     Inspection   Deformity: present    Other   Sensation: normal    Comments:  Splint was removed the time surgery.  There was significant deformity to the right ankle.  Mild-to-moderate swelling of the ankle and foot region.  Range of motion deferred.        Significant Labs: BMP:   Recent Labs   Lab 08/08/22  0334      K 4.7   CO2 25   BUN 18.0   CREATININE 0.82   CALCIUM 9.6   MG 2.10     CBC:   Recent Labs   Lab 08/07/22  0218 08/08/22  0334   WBC 9.4 6.8   HGB 12.8 11.5*   HCT 39.0 36.1*    284     All pertinent labs within the past 24 hours have been reviewed.    Significant Imaging: None    Assessment/Plan:     * Closed trimalleolar fracture of right ankle  Right trimalleolar ankle fracture.  Patient underwent open reduction internal fixation without complications.  Continue current medical management.  Patient is nonweightbearing, PT for evaluation and mobility.  Awaiting rehab placement.  Follow-up with Dr. Lundy 2 weeks postoperatively.          MASSIEL Sarmiento  Orthopedics  [facility]

## 2022-08-08 NOTE — SUBJECTIVE & OBJECTIVE
"Principal Problem:Closed trimalleolar fracture of right ankle    Principal Orthopedic Problem:  Closed trimalleolar fracture.    Interval History:  Patient underwent ORIF of the right distal tibia and fibula with placement of plate and screws in the fibula and screws in the tibia.  After surgery posterior lower leg splint using plaster was applied.  Patient is awaiting rehab placement.  She and the family have chosen [facility].  Referral has been sent.  Patient will be nonweightbearing to the right lower extremity.  Case Management will get with the patient regarding DME needs.    Review of patient's allergies indicates:   Allergen Reactions    Adhesive        Current Facility-Administered Medications   Medication    acetaminophen tablet 650 mg    atorvastatin tablet 40 mg    diphenhydrAMINE capsule 25 mg    EScitalopram oxalate tablet 20 mg    HYDROmorphone (PF) injection 1 mg    HYDROmorphone (PF) injection 1 mg    ketorolac injection 30 mg    lactated ringers infusion    lisinopriL tablet 10 mg    sodium chloride 0.9% flush 10 mL    traMADoL tablet 50 mg     Objective:     Vital Signs (Most Recent):  Temp: 97.5 °F (36.4 °C) (08/08/22 1310)  Pulse: 85 (08/08/22 1310)  Resp: 14 (08/08/22 1055)  BP: (!) 107/55 (08/08/22 1310)  SpO2: 97 % (08/08/22 1310)   Vital Signs (24h Range):  Temp:  [97.5 °F (36.4 °C)-98.2 °F (36.8 °C)] 97.5 °F (36.4 °C)  Pulse:  [] 85  Resp:  [14-20] 14  SpO2:  [94 %-98 %] 97 %  BP: (107-171)/(55-95) 107/55     Weight: 81.6 kg (180 lb)  Height: 5' 5" (165.1 cm)  Body mass index is 29.95 kg/m².      Intake/Output Summary (Last 24 hours) at 8/8/2022 1317  Last data filed at 8/8/2022 1305  Gross per 24 hour   Intake 1300 ml   Output --   Net 1300 ml       General    Vitals reviewed.  Constitutional: She is oriented to person, place, and time. She appears well-developed and well-nourished.   HENT:   Head: Normocephalic.   Nose: Nose normal.   Abrasion above the right eye "   Eyes: EOM are normal. Pupils are equal, round, and reactive to light.   Neck: Neck supple.   Cardiovascular:  Normal rate.            Pulmonary/Chest: Effort normal. No respiratory distress.   Abdominal: Soft. There is no abdominal tenderness.   Neurological: She is alert and oriented to person, place, and time.   Psychiatric: She has a normal mood and affect. Her behavior is normal.         Right Ankle/Foot Exam     Inspection   Deformity: present    Other   Sensation: normal    Comments:  Splint was removed the time surgery.  There was significant deformity to the right ankle.  Mild-to-moderate swelling of the ankle and foot region.  Range of motion deferred.        Significant Labs: BMP:   Recent Labs   Lab 08/08/22  0334      K 4.7   CO2 25   BUN 18.0   CREATININE 0.82   CALCIUM 9.6   MG 2.10     CBC:   Recent Labs   Lab 08/07/22  0218 08/08/22  0334   WBC 9.4 6.8   HGB 12.8 11.5*   HCT 39.0 36.1*    284     All pertinent labs within the past 24 hours have been reviewed.    Significant Imaging: None

## 2022-08-08 NOTE — PROGRESS NOTES
[facility] Medicine Progress Note    Patient Name: Alicia Bhagat  Age: 62 y.o.   Code Status: Full Code  MRN: [id number]  Admission Date: 8/7/2022  1:46 AM   Patient Class: IP- Inpatient  Hospital Length of Stay: 1 days  Attending Provider: Ranjeet Tamayo MD  Primary Care Provider: Albaro Bonilla MD   Chief Complaint:   Chief Complaint   Patient presents with    Ankle Pain     States tripped and fell tonight. Obvious R ankle deformity with cassie splint in place. 50mcg fentanyl given in route. Abrasion to R knee and above R eye. Denies LOC           SUBJECTIVE:     Follow-up:  Closed trimalleolar fracture of right ankle    HPI:  61 yo female presents to the ED last night after sustaining a fall in which she did strike her head but no LOC and twisted her right ankle.  She was at the [employer] drinking [employer]to[employer].  On her way out she tripped and fell.  She was found to have a displaced trimalleolar fracture of the right ankle.  She was also intoxicated with a Etoh level of 283.  Pain is 9/10 in intensity with no relieving factors.  She denies any N/V/D/C.  No chest pain/SOB.  No fever/chills.  Orthopedics has been consulted and repair is scheduled for tomorrow.     Last 24h: Underwent surgical repair of Left Ankle Fx today w/o complications. Feels well. Pain controlled for the most part. No acute complaints. Awaiting rehab placement.    Scheduled Meds:   atorvastatin  40 mg Oral Daily    EScitalopram oxalate  20 mg Oral Daily    lisinopriL  10 mg Oral Daily    sodium chloride 0.9%  10 mL Intravenous Q8H     Continuous Infusions:   lactated ringers 50 mL/hr at 08/08/22 1241     PRN Meds:   acetaminophen    diphenhydrAMINE    HYDROmorphone    HYDROmorphone    ketorolac    morphine    ondansetron    ondansetron    oxyCODONE    traMADoL      Lines/Drains:   Lines/Drains/Airways     None                   Allergies as of 08/07/2022 - Reviewed 08/07/2022   Allergen Reaction Noted     Adhesive  08/07/2022         Review of Systems: 10 systems reviewed all pertinent positives and negatives stated in HPI, otherwise negative.       OBJECTIVE:     Vital Signs (Most Recent)  Temp: 98.5 °F (36.9 °C) (08/08/22 1644)  Pulse: 100 (08/08/22 1644)  Resp: 18 (08/08/22 1512)  BP: (!) 149/74 (08/08/22 1644)  SpO2: (!) 94 % (08/08/22 1644)    Vital Signs Range (Last 24H):  Temp:  [97.5 °F (36.4 °C)-98.5 °F (36.9 °C)]   Pulse:  []   Resp:  [14-20]   BP: (103-171)/(49-95)   SpO2:  [94 %-98 %]     I & O (Last 24H):    Intake/Output Summary (Last 24 hours) at 8/8/2022 1737  Last data filed at 8/8/2022 1420  Gross per 24 hour   Intake 1750 ml   Output --   Net 1750 ml       Physical Exam  Constitutional:       General: She is not in acute distress.     Appearance: Normal appearance.   HENT:      Head: Normocephalic and atraumatic.      Nose: Nose normal.      Mouth/Throat:      Mouth: Mucous membranes are moist.      Pharynx: Oropharynx is clear.   Eyes:      Extraocular Movements: Extraocular movements intact.      Conjunctiva/sclera: Conjunctivae normal.      Pupils: Pupils are equal, round, and reactive to light.   Cardiovascular:      Rate and Rhythm: Normal rate and regular rhythm.      Heart sounds: Normal heart sounds. No murmur heard.  Pulmonary:      Effort: Pulmonary effort is normal.      Breath sounds: Normal breath sounds. No wheezing, rhonchi or rales.   Abdominal:      General: Bowel sounds are normal.      Palpations: Abdomen is soft.   Musculoskeletal:         General: Normal range of motion.      Cervical back: Normal range of motion and neck supple.      Right lower leg: No edema.      Comments: LLE bandaged in splint   Skin:     General: Skin is warm and dry.   Neurological:      General: No focal deficit present.      Mental Status: She is alert and oriented to person, place, and time. Mental status is at baseline.   Psychiatric:         Mood and Affect: Mood normal.         Behavior:  Behavior normal.         Thought Content: Thought content normal.         Judgment: Judgment normal.          Recent Labs   Lab 08/07/22  0218 08/08/22  0334   WBC 9.4 6.8   HGB 12.8 11.5*   HCT 39.0 36.1*    284   MCV 94.4* 95.5*   PTT 24.1  --    INR 1.08  --         Recent Labs   Lab 08/07/22  0218 08/08/22  0334    136   K 4.2 4.7   CHLORIDE 103 104   CO2 21* 25   BUN 11.0 18.0   CREATININE 0.82 0.82   GLUCOSE 146* 114   MG  --  2.10   CALCIUM 10.3* 9.6   ALBUMIN 4.1 3.4   BILITOT 0.4 0.5   ALKPHOS 89 81   ALT 39 31   AST 22 21           ASSESSMENT/PLAN:     Patient Active Problem List    Diagnosis Date Noted    Closed trimalleolar fracture of right ankle 08/07/2022    Depression     Hyperlipidemia 08/07/2022    Hypertension 08/07/2022    Angina pectoris 08/07/2022        - Ortho recs  - Rehab placment pending  - home meds  - analgesia prn  - am labs        VTE Risk Mitigation (From admission, onward)         Ordered     IP VTE LOW RISK PATIENT  Once         08/07/22 1215     Place sequential compression device  Until discontinued         08/07/22 1215                       Ranjeet Tamayo MD  [facility] Medicine   [facility]

## 2022-08-08 NOTE — ANESTHESIA POSTPROCEDURE EVALUATION
Anesthesia Post Evaluation    Patient: Alicia Bhagat    Procedure(s) Performed: Procedure(s) (LRB):  ORIF, ANKLE (Right)    Final Anesthesia Type: spinal      Patient location during evaluation: PACU  Patient participation: Yes- Able to Participate  Level of consciousness: awake and alert  Post-procedure vital signs: reviewed and stable  Pain management: adequate  Airway patency: patent  LYDIA mitigation strategies: Use of major conduction anesthesia (spinal/epidural) or peripheral nerve block  PONV status at discharge: No PONV  Anesthetic complications: no      Cardiovascular status: hemodynamically stable  Respiratory status: unassisted, room air and spontaneous ventilation  Hydration status: euvolemic  Follow-up not needed.  Comments: Resolving SAB          Vitals Value Taken Time   /55 08/08/22 1310   Temp 36.3 08/08/22 1310   Pulse 85 08/08/22 1310   Resp 18 08/08/22 1310   SpO2 97 % 08/08/22 1310   Vitals shown include unvalidated device data.      No case tracking events are documented in the log.      Pain/Malka Score: Pain Rating Prior to Med Admin: 7 (8/8/2022 10:55 AM)

## 2022-08-08 NOTE — PLAN OF CARE
Pt lives at home alone. Independent and drives self. PCP- Albaro Bonilla. No DME used or HH services. Consult for rehab vs SNF placement at [place] due to pt living alone and being non-weight bearing after surgery. Discussed first choice of SNF at [facility] they lives in [place] but unfortunately they do not accept any mgd plans and pts insurance is medicaid mgd. She states they have a place in [place] that accepts it but she wants me to come back at noon when her niece Karyna gets here to discuss choices.    Notified Lilian at [facility] to call in LOCET to obtain 142.
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
## 2022-08-09 PROBLEM — F10.929 ALCOHOLIC INTOXICATION WITH COMPLICATION: Status: ACTIVE | Noted: 2022-08-09

## 2022-08-09 LAB
ALBUMIN SERPL-MCNC: 3.2 GM/DL (ref 3.4–4.8)
BASOPHILS # BLD AUTO: 0.03 X10(3)/MCL (ref 0–0.2)
BASOPHILS NFR BLD AUTO: 0.4 %
BUN SERPL-MCNC: 11 MG/DL (ref 9.8–20.1)
CALCIUM SERPL-MCNC: 9.6 MG/DL (ref 8.4–10.2)
CHLORIDE SERPL-SCNC: 104 MMOL/L (ref 98–107)
CO2 SERPL-SCNC: 25 MMOL/L (ref 23–31)
CREAT SERPL-MCNC: 0.78 MG/DL (ref 0.55–1.02)
EOSINOPHIL # BLD AUTO: 0.32 X10(3)/MCL (ref 0–0.9)
EOSINOPHIL NFR BLD AUTO: 4.2 %
ERYTHROCYTE [DISTWIDTH] IN BLOOD BY AUTOMATED COUNT: 14 % (ref 11.5–17)
GFR SERPLBLD CREATININE-BSD FMLA CKD-EPI: >60 MLS/MIN/1.73/M2
GLUCOSE SERPL-MCNC: 130 MG/DL (ref 82–115)
HCT VFR BLD AUTO: 34.3 % (ref 37–47)
HGB BLD-MCNC: 10.9 GM/DL (ref 12–16)
IMM GRANULOCYTES # BLD AUTO: 0.06 X10(3)/MCL (ref 0–0.04)
IMM GRANULOCYTES NFR BLD AUTO: 0.8 %
LYMPHOCYTES # BLD AUTO: 1.44 X10(3)/MCL (ref 0.6–4.6)
LYMPHOCYTES NFR BLD AUTO: 19 %
MAGNESIUM SERPL-MCNC: 2 MG/DL (ref 1.6–2.6)
MCH RBC QN AUTO: 30.9 PG (ref 27–31)
MCHC RBC AUTO-ENTMCNC: 31.8 MG/DL (ref 33–36)
MCV RBC AUTO: 97.2 FL (ref 80–94)
MONOCYTES # BLD AUTO: 0.75 X10(3)/MCL (ref 0.1–1.3)
MONOCYTES NFR BLD AUTO: 9.9 %
NEUTROPHILS # BLD AUTO: 5 X10(3)/MCL (ref 2.1–9.2)
NEUTROPHILS NFR BLD AUTO: 65.7 %
PHOSPHATE SERPL-MCNC: 2.4 MG/DL (ref 2.3–4.7)
PLATELET # BLD AUTO: 249 X10(3)/MCL (ref 130–400)
PMV BLD AUTO: 8.7 FL (ref 7.4–10.4)
POTASSIUM SERPL-SCNC: 5.2 MMOL/L (ref 3.5–5.1)
RBC # BLD AUTO: 3.53 X10(6)/MCL (ref 4.2–5.4)
SODIUM SERPL-SCNC: 137 MMOL/L (ref 136–145)
WBC # SPEC AUTO: 7.6 X10(3)/MCL (ref 4.5–11.5)

## 2022-08-09 PROCEDURE — 63600175 PHARM REV CODE 636 W HCPCS: Performed by: PHYSICIAN ASSISTANT

## 2022-08-09 PROCEDURE — 25000003 PHARM REV CODE 250: Performed by: PHYSICIAN ASSISTANT

## 2022-08-09 PROCEDURE — 11000001 HC ACUTE MED/SURG PRIVATE ROOM

## 2022-08-09 PROCEDURE — 83735 ASSAY OF MAGNESIUM: CPT | Performed by: INTERNAL MEDICINE

## 2022-08-09 PROCEDURE — 99900035 HC TECH TIME PER 15 MIN (STAT)

## 2022-08-09 PROCEDURE — 80069 RENAL FUNCTION PANEL: CPT | Performed by: INTERNAL MEDICINE

## 2022-08-09 PROCEDURE — 25000003 PHARM REV CODE 250: Performed by: INTERNAL MEDICINE

## 2022-08-09 PROCEDURE — 36415 COLL VENOUS BLD VENIPUNCTURE: CPT | Performed by: INTERNAL MEDICINE

## 2022-08-09 PROCEDURE — 85025 COMPLETE CBC W/AUTO DIFF WBC: CPT | Performed by: INTERNAL MEDICINE

## 2022-08-09 PROCEDURE — 97530 THERAPEUTIC ACTIVITIES: CPT

## 2022-08-09 PROCEDURE — 94761 N-INVAS EAR/PLS OXIMETRY MLT: CPT

## 2022-08-09 PROCEDURE — A4216 STERILE WATER/SALINE, 10 ML: HCPCS | Performed by: PHYSICIAN ASSISTANT

## 2022-08-09 RX ORDER — ASPIRIN 325 MG
325 TABLET, DELAYED RELEASE (ENTERIC COATED) ORAL DAILY
Status: DISCONTINUED | OUTPATIENT
Start: 2022-08-09 | End: 2022-08-10 | Stop reason: HOSPADM

## 2022-08-09 RX ORDER — METOPROLOL TARTRATE 25 MG/1
25 TABLET, FILM COATED ORAL 2 TIMES DAILY
Qty: 60 TABLET | Refills: 11 | Status: ON HOLD
Start: 2022-08-09 | End: 2023-12-01 | Stop reason: CLARIF

## 2022-08-09 RX ORDER — ERGOCALCIFEROL 1.25 MG/1
50000 CAPSULE ORAL
COMMUNITY

## 2022-08-09 RX ORDER — METOPROLOL TARTRATE 25 MG/1
25 TABLET, FILM COATED ORAL 2 TIMES DAILY
Status: DISCONTINUED | OUTPATIENT
Start: 2022-08-09 | End: 2022-08-10 | Stop reason: HOSPADM

## 2022-08-09 RX ADMIN — LISINOPRIL 10 MG: 10 TABLET ORAL at 06:08

## 2022-08-09 RX ADMIN — MORPHINE SULFATE 4 MG: 10 INJECTION INTRAVENOUS at 01:08

## 2022-08-09 RX ADMIN — OXYCODONE HYDROCHLORIDE 10 MG: 5 TABLET ORAL at 10:08

## 2022-08-09 RX ADMIN — KETOROLAC TROMETHAMINE 30 MG: 30 INJECTION, SOLUTION INTRAMUSCULAR; INTRAVENOUS at 01:08

## 2022-08-09 RX ADMIN — SODIUM CHLORIDE, PRESERVATIVE FREE 10 ML: 5 INJECTION INTRAVENOUS at 09:08

## 2022-08-09 RX ADMIN — METOPROLOL TARTRATE 25 MG: 25 TABLET, FILM COATED ORAL at 09:08

## 2022-08-09 RX ADMIN — MORPHINE SULFATE 4 MG: 10 INJECTION INTRAVENOUS at 07:08

## 2022-08-09 RX ADMIN — ATORVASTATIN CALCIUM 40 MG: 40 TABLET, FILM COATED ORAL at 08:08

## 2022-08-09 RX ADMIN — ASPIRIN 325 MG: 325 TABLET, COATED ORAL at 09:08

## 2022-08-09 RX ADMIN — KETOROLAC TROMETHAMINE 30 MG: 30 INJECTION, SOLUTION INTRAMUSCULAR; INTRAVENOUS at 12:08

## 2022-08-09 RX ADMIN — ESCITALOPRAM 20 MG: 5 TABLET, FILM COATED ORAL at 08:08

## 2022-08-09 RX ADMIN — OXYCODONE HYDROCHLORIDE 10 MG: 5 TABLET ORAL at 06:08

## 2022-08-09 RX ADMIN — SODIUM CHLORIDE, PRESERVATIVE FREE 10 ML: 5 INJECTION INTRAVENOUS at 02:08

## 2022-08-09 NOTE — PROGRESS NOTES
[facility]  Orthopedics  Progress Note    Patient Name: Alicia Bhagat  MRN: [id number]  Admission Date: 8/7/2022  Hospital Length of Stay: 2 days  Attending Provider: Ranjeet Tamayo MD  Primary Care Provider: Albaro Bonilla MD  Follow-up For: Procedure(s) (LRB):  ORIF, ANKLE (Right)    Post-Operative Day: 1 Day Post-Op  Subjective:     Principal Problem:Closed trimalleolar fracture of right ankle    Principal Orthopedic Problem:  Closed right distal tibia and fibula fractures.    Interval History:  Patient is postop day 1. From an ORIF of the right distal tibia and fibula.  [place].  She is complaining of some pain.  She does appear to be neurovascular intact to the distal extremity.  Posterior splint is properly in place.  Capillary refill is good.  We will continue her pain management.  We are waiting acceptance to [facility].  Patient is nonweightbearing.    Review of patient's allergies indicates:   Allergen Reactions    Adhesive        Current Facility-Administered Medications   Medication    acetaminophen tablet 650 mg    atorvastatin tablet 40 mg    diphenhydrAMINE capsule 25 mg    EScitalopram oxalate tablet 20 mg    HYDROmorphone (PF) injection 1 mg    HYDROmorphone (PF) injection 1 mg    ketorolac injection 30 mg    lactated ringers infusion    lisinopriL tablet 10 mg    morphine injection 4 mg    ondansetron disintegrating tablet 8 mg    ondansetron injection 4 mg    oxyCODONE immediate release tablet 10 mg    sodium chloride 0.9% flush 10 mL    traMADoL tablet 50 mg     Objective:     Vital Signs (Most Recent):  Temp: 98.5 °F (36.9 °C) (08/09/22 0750)  Pulse: 106 (08/09/22 0750)  Resp: 18 (08/09/22 0752)  BP: (!) 163/90 (08/09/22 0750)  SpO2: 95 % (08/09/22 0800)   Vital Signs (24h Range):  Temp:  [97.5 °F (36.4 °C)-98.6 °F (37 °C)] 98.5 °F (36.9 °C)  Pulse:  []  "106  Resp:  [14-20] 18  SpO2:  [94 %-98 %] 95 %  BP: (103-182)/(49-95) 163/90     Weight: 81.6 kg (180 lb)  Height: 5' 5" (165.1 cm)  Body mass index is 29.95 kg/m².      Intake/Output Summary (Last 24 hours) at 8/9/2022 1014  Last data filed at 8/9/2022 0600  Gross per 24 hour   Intake 1750 ml   Output 1250 ml   Net 500 ml       General    Constitutional: She is oriented to person, place, and time. She appears well-developed and well-nourished.   HENT:   Head: Normocephalic.   Abrasion above right eye   Eyes: EOM are normal. Pupils are equal, round, and reactive to light.   Neck: Neck supple.   Cardiovascular:  Normal rate.            Pulmonary/Chest: Effort normal. No respiratory distress.   Abdominal: Soft. There is no abdominal tenderness.   Neurological: She is alert and oriented to person, place, and time.   Psychiatric: She has a normal mood and affect. Her behavior is normal.         Right Ankle/Foot Exam     Comments:  Posterior lower leg splint is in place.  She appears to be neurovascular intact distal extremity.  Capillary refill is normal.    Left Ankle/Foot Exam   Left ankle exam is normal.      Significant Labs: BMP:   Recent Labs   Lab 08/09/22  0336      K 5.2*   CO2 25   BUN 11.0   CREATININE 0.78   CALCIUM 9.6   MG 2.00     CBC:   Recent Labs   Lab 08/08/22  0334 08/09/22  0336   WBC 6.8 7.6   HGB 11.5* 10.9*   HCT 36.1* 34.3*    249     All pertinent labs within the past 24 hours have been reviewed.    Significant Imaging: None    Assessment/Plan:     * Closed trimalleolar fracture of right ankle  Patient is postop day 1. From an ORIF of the right distal tibia and fibula.  Is secondary to fracture sustained from a fall.  Awaiting placement to [facility].  Patient is nonweightbearing to the right lower extremity.  Continue current medical management and physical therapy for mobility.  Patient will follow up with Dr. Lundy approximately 2 weeks " postoperatively.  Prescriptions on the chart for Norco for pain and Duricef 500 mg b.i.d. for 10 days.  She is also to continue aspirin 325 mg once daily for DVT prophylaxis.          MASSIEL Sarmiento  Orthopedics  [facility]

## 2022-08-09 NOTE — ASSESSMENT & PLAN NOTE
Patient is postop day 1. From an ORIF of the right distal tibia and fibula.  Is secondary to fracture sustained from a fall.  Awaiting placement to [facility].  Patient is nonweightbearing to the right lower extremity.  Continue current medical management and physical therapy for mobility.  Patient will follow up with Dr. Lundy approximately 2 weeks postoperatively.  Prescriptions on the chart for Norco for pain and Duricef 500 mg b.i.d. for 10 days.  She is also to continue aspirin 325 mg once daily for DVT prophylaxis.

## 2022-08-09 NOTE — PT/OT/SLP PROGRESS
Physical Therapy Treatment    Patient Name:  Alicia Bhagat   MRN:  [id number]    Recommendations:     Discharge Recommendations:  rehabilitation facility   Discharge Equipment Recommendations: walker, standard, wheelchair   Barriers to discharge: None    Assessment:     Alicia Bhagat is a 62 y.o. female admitted with a medical diagnosis of Closed trimalleolar fracture of right ankle.  She presents with the following impairments/functional limitations:  orthopedic precautions, pain, impaired balance, gait instability, weakness.    Pt reports pain in ankle but also soreness to BLE. She was anxious but states that she feels more confident today.  She was able to get up to sitting at EOB from supine with SBA. She stood to RW with min A, able to maintain NWB to RLE. Pt was able to ambulate, NWB to RLE, x 5' with min A and RW. She fatigued quickly but did well sequencing with AD. She was assisted to her chair following, reviewed exercises to perform throughout the day.    Rehab Prognosis: Good; patient would benefit from acute skilled PT services to address these deficits and reach maximum level of function.    Recent Surgery: Procedure(s) (LRB):  ORIF, ANKLE (Right) 1 Day Post-Op    Plan:     During this hospitalization, patient to be seen BID to address the identified rehab impairments via gait training, therapeutic activities, therapeutic exercises and progress toward the following goals:    · Plan of Care Expires:  09/05/22    Subjective     Chief Complaint: pain  Patient/Family Comments/goals: to be able to return home  Pain/Comfort:  · Pain Rating Post-Intervention 1: 5/10  · Location - Side 2: Right  · Location 2: ankle      Objective:     Communicated with patient prior to session.  Patient found HOB elevated with peripheral IV, PureWick upon PT entry to room.     General Precautions: Standard, fall   Orthopedic Precautions:RLE non weight bearing   Braces:    Respiratory Status: Room air     Functional  Mobility:  · Bed Mobility:     · Supine to Sit: stand by assistance  · Transfers:     · Sit to Stand:  minimum assistance with rolling walker  · Gait: 5' min A with RW, NWB RLE  · Balance: min A in supported standiong      AM-PAC 6 CLICK MOBILITY          Therapeutic Activities and Exercises:   see above    Patient left up in chair with all lines intact and call button in reach..    GOALS:   Multidisciplinary Problems     Physical Therapy Goals        Problem: Physical Therapy    Goal Priority Disciplines Outcome Goal Variances Interventions   Physical Therapy Goal     PT, PT/OT Ongoing, Progressing     Description: Goals to be met by: 22     Patient will increase functional independence with mobility by performin. Supine to sit with Contact Guard Assistance  2. Sit to stand transfer with Contact Guard Assistance  3. Wheelchair propulsion x500 feet with Supervision using bilateral uppper extremities                     Time Tracking:     PT Received On: 22  PT Start Time: 0850     PT Stop Time: 10  PT Total Time (min): 20 min     Billable Minutes: Therapeutic Activity 20    Treatment Type: Treatment  PT/PTA: PTA           2022

## 2022-08-09 NOTE — SUBJECTIVE & OBJECTIVE
"Principal Problem:Closed trimalleolar fracture of right ankle    Principal Orthopedic Problem:  Closed right distal tibia and fibula fractures.    Interval History:  Patient is postop day 1. From an ORIF of the right distal tibia and fibula.  [place].  She is complaining of some pain.  She does appear to be neurovascular intact to the distal extremity.  Posterior splint is properly in place.  Capillary refill is good.  We will continue her pain management.  We are waiting acceptance to [facility].  Patient is nonweightbearing.    Review of patient's allergies indicates:   Allergen Reactions    Adhesive        Current Facility-Administered Medications   Medication    acetaminophen tablet 650 mg    atorvastatin tablet 40 mg    diphenhydrAMINE capsule 25 mg    EScitalopram oxalate tablet 20 mg    HYDROmorphone (PF) injection 1 mg    HYDROmorphone (PF) injection 1 mg    ketorolac injection 30 mg    lactated ringers infusion    lisinopriL tablet 10 mg    morphine injection 4 mg    ondansetron disintegrating tablet 8 mg    ondansetron injection 4 mg    oxyCODONE immediate release tablet 10 mg    sodium chloride 0.9% flush 10 mL    traMADoL tablet 50 mg     Objective:     Vital Signs (Most Recent):  Temp: 98.5 °F (36.9 °C) (08/09/22 0750)  Pulse: 106 (08/09/22 0750)  Resp: 18 (08/09/22 0752)  BP: (!) 163/90 (08/09/22 0750)  SpO2: 95 % (08/09/22 0800)   Vital Signs (24h Range):  Temp:  [97.5 °F (36.4 °C)-98.6 °F (37 °C)] 98.5 °F (36.9 °C)  Pulse:  [] 106  Resp:  [14-20] 18  SpO2:  [94 %-98 %] 95 %  BP: (103-182)/(49-95) 163/90     Weight: 81.6 kg (180 lb)  Height: 5' 5" (165.1 cm)  Body mass index is 29.95 kg/m².      Intake/Output Summary (Last 24 hours) at 8/9/2022 1014  Last data filed at 8/9/2022 0600  Gross per 24 hour   Intake 1750 ml   Output 1250 ml   Net 500 ml       General    Constitutional: She is oriented to person, place, and " time. She appears well-developed and well-nourished.   HENT:   Head: Normocephalic.   Abrasion above right eye   Eyes: EOM are normal. Pupils are equal, round, and reactive to light.   Neck: Neck supple.   Cardiovascular:  Normal rate.            Pulmonary/Chest: Effort normal. No respiratory distress.   Abdominal: Soft. There is no abdominal tenderness.   Neurological: She is alert and oriented to person, place, and time.   Psychiatric: She has a normal mood and affect. Her behavior is normal.         Right Ankle/Foot Exam     Comments:  Posterior lower leg splint is in place.  She appears to be neurovascular intact distal extremity.  Capillary refill is normal.    Left Ankle/Foot Exam   Left ankle exam is normal.      Significant Labs: BMP:   Recent Labs   Lab 08/09/22  0336      K 5.2*   CO2 25   BUN 11.0   CREATININE 0.78   CALCIUM 9.6   MG 2.00     CBC:   Recent Labs   Lab 08/08/22  0334 08/09/22  0336   WBC 6.8 7.6   HGB 11.5* 10.9*   HCT 36.1* 34.3*    249     All pertinent labs within the past 24 hours have been reviewed.    Significant Imaging: None

## 2022-08-10 VITALS
HEART RATE: 74 BPM | SYSTOLIC BLOOD PRESSURE: 129 MMHG | RESPIRATION RATE: 16 BRPM | WEIGHT: 180 LBS | BODY MASS INDEX: 29.99 KG/M2 | DIASTOLIC BLOOD PRESSURE: 78 MMHG | HEIGHT: 65 IN | TEMPERATURE: 98 F | OXYGEN SATURATION: 93 %

## 2022-08-10 PROBLEM — S82.851A CLOSED TRIMALLEOLAR FRACTURE OF RIGHT ANKLE: Status: RESOLVED | Noted: 2022-08-07 | Resolved: 2022-08-10

## 2022-08-10 PROBLEM — F10.929 ALCOHOLIC INTOXICATION WITH COMPLICATION: Status: RESOLVED | Noted: 2022-08-09 | Resolved: 2022-08-10
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
## 2022-08-10 LAB
ANION GAP SERPL CALC-SCNC: 9 MEQ/L
BASOPHILS # BLD AUTO: 0.03 X10(3)/MCL (ref 0–0.2)
BASOPHILS NFR BLD AUTO: 0.5 %
BUN SERPL-MCNC: 10 MG/DL (ref 9.8–20.1)
CALCIUM SERPL-MCNC: 9.8 MG/DL (ref 8.4–10.2)
CHLORIDE SERPL-SCNC: 102 MMOL/L (ref 98–107)
CO2 SERPL-SCNC: 27 MMOL/L (ref 23–31)
CREAT SERPL-MCNC: 0.74 MG/DL (ref 0.55–1.02)
CREAT/UREA NIT SERPL: 14
EOSINOPHIL # BLD AUTO: 0.3 X10(3)/MCL (ref 0–0.9)
EOSINOPHIL NFR BLD AUTO: 5.2 %
ERYTHROCYTE [DISTWIDTH] IN BLOOD BY AUTOMATED COUNT: 13.5 % (ref 11.5–17)
GFR SERPLBLD CREATININE-BSD FMLA CKD-EPI: >60 MLS/MIN/1.73/M2
GLUCOSE SERPL-MCNC: 110 MG/DL (ref 82–115)
HCT VFR BLD AUTO: 36.7 % (ref 37–47)
HGB BLD-MCNC: 11.5 GM/DL (ref 12–16)
IMM GRANULOCYTES # BLD AUTO: 0.07 X10(3)/MCL (ref 0–0.04)
IMM GRANULOCYTES NFR BLD AUTO: 1.2 %
LYMPHOCYTES # BLD AUTO: 1.16 X10(3)/MCL (ref 0.6–4.6)
LYMPHOCYTES NFR BLD AUTO: 20.2 %
MCH RBC QN AUTO: 30.6 PG (ref 27–31)
MCHC RBC AUTO-ENTMCNC: 31.3 MG/DL (ref 33–36)
MCV RBC AUTO: 97.6 FL (ref 80–94)
MONOCYTES # BLD AUTO: 0.68 X10(3)/MCL (ref 0.1–1.3)
MONOCYTES NFR BLD AUTO: 11.9 %
NEUTROPHILS # BLD AUTO: 3.5 X10(3)/MCL (ref 2.1–9.2)
NEUTROPHILS NFR BLD AUTO: 61 %
PLATELET # BLD AUTO: 256 X10(3)/MCL (ref 130–400)
PMV BLD AUTO: 8.8 FL (ref 7.4–10.4)
POTASSIUM SERPL-SCNC: 4.9 MMOL/L (ref 3.5–5.1)
RBC # BLD AUTO: 3.76 X10(6)/MCL (ref 4.2–5.4)
SODIUM SERPL-SCNC: 138 MMOL/L (ref 136–145)
WBC # SPEC AUTO: 5.7 X10(3)/MCL (ref 4.5–11.5)

## 2022-08-10 PROCEDURE — 36415 COLL VENOUS BLD VENIPUNCTURE: CPT | Performed by: INTERNAL MEDICINE

## 2022-08-10 PROCEDURE — 25000003 PHARM REV CODE 250: Performed by: INTERNAL MEDICINE

## 2022-08-10 PROCEDURE — 25000003 PHARM REV CODE 250: Performed by: PHYSICIAN ASSISTANT

## 2022-08-10 PROCEDURE — 97530 THERAPEUTIC ACTIVITIES: CPT

## 2022-08-10 PROCEDURE — 94761 N-INVAS EAR/PLS OXIMETRY MLT: CPT

## 2022-08-10 PROCEDURE — A4216 STERILE WATER/SALINE, 10 ML: HCPCS | Performed by: PHYSICIAN ASSISTANT

## 2022-08-10 PROCEDURE — 80048 BASIC METABOLIC PNL TOTAL CA: CPT | Performed by: INTERNAL MEDICINE

## 2022-08-10 PROCEDURE — 99900035 HC TECH TIME PER 15 MIN (STAT)

## 2022-08-10 PROCEDURE — 85025 COMPLETE CBC W/AUTO DIFF WBC: CPT | Performed by: INTERNAL MEDICINE

## 2022-08-10 RX ADMIN — LISINOPRIL 10 MG: 10 TABLET ORAL at 08:08

## 2022-08-10 RX ADMIN — ATORVASTATIN CALCIUM 40 MG: 40 TABLET, FILM COATED ORAL at 08:08

## 2022-08-10 RX ADMIN — TRAMADOL HYDROCHLORIDE 50 MG: 50 TABLET, COATED ORAL at 08:08

## 2022-08-10 RX ADMIN — ESCITALOPRAM 20 MG: 5 TABLET, FILM COATED ORAL at 08:08

## 2022-08-10 RX ADMIN — SODIUM CHLORIDE, PRESERVATIVE FREE 10 ML: 5 INJECTION INTRAVENOUS at 05:08

## 2022-08-10 RX ADMIN — ASPIRIN 325 MG: 325 TABLET, COATED ORAL at 08:08

## 2022-08-10 NOTE — PROGRESS NOTES
[facility] Medicine Progress Note    Patient Name: Alicia Bhagat  Age: 62 y.o.   Code Status: Full Code  MRN: [id number]  Admission Date: 8/7/2022  1:46 AM   Patient Class: IP- Inpatient  Hospital Length of Stay: 2 days  Attending Provider: Ranjeet Tamayo MD  Primary Care Provider: Albaro Bonilla MD   Chief Complaint:   Chief Complaint   Patient presents with    Ankle Pain     States tripped and fell tonight. Obvious R ankle deformity with cassie splint in place. 50mcg fentanyl given in route. Abrasion to R knee and above R eye. Denies LOC           SUBJECTIVE:     Follow-up:  Closed trimalleolar fracture of right ankle    HPI:  63 yo female presents to the ED last night after sustaining a fall in which she did strike her head but no LOC and twisted her right ankle.  She was at the [employer] drinking Etoh.  On her way out she tripped and fell.  She was found to have a displaced trimalleolar fracture of the right ankle.  She was also intoxicated with a Etoh level of 283.  Pain is 9/10 in intensity with no relieving factors.  She denies any N/V/D/C.  No chest pain/SOB.  No fever/chills.  Orthopedics has been consulted and repair is scheduled for tomorrow.     Last 24h: No new complaints. Pain adequately controlled. Awaiting Rehab placement at [facility]. BP uncontrolled, will titrate meds.    Scheduled Meds:   atorvastatin  40 mg Oral Daily    EScitalopram oxalate  20 mg Oral Daily    lisinopriL  10 mg Oral Daily    sodium chloride 0.9%  10 mL Intravenous Q8H     Continuous Infusions:  PRN Meds:   acetaminophen    diphenhydrAMINE    HYDROmorphone    HYDROmorphone    ketorolac    morphine    ondansetron    ondansetron    oxyCODONE    traMADoL      Lines/Drains:   Lines/Drains/Airways     None                   Allergies as of 08/07/2022 - Reviewed 08/07/2022   Allergen Reaction Noted    Adhesive  08/07/2022         Review of Systems: 10 systems reviewed all  pertinent positives and negatives stated in HPI, otherwise negative.       OBJECTIVE:     Vital Signs (Most Recent)  Temp: 99.1 °F (37.3 °C) (08/09/22 1134)  Pulse: 106 (08/09/22 1648)  Resp: 18 (08/09/22 1846)  BP: (!) 158/78 (08/09/22 1648)  SpO2: 95 % (08/09/22 1648)    Vital Signs Range (Last 24H):  Temp:  [98.2 °F (36.8 °C)-99.1 °F (37.3 °C)]   Pulse:  []   Resp:  [16-20]   BP: (151-182)/(78-95)   SpO2:  [92 %-95 %]     I & O (Last 24H):    Intake/Output Summary (Last 24 hours) at 8/9/2022 2007  Last data filed at 8/9/2022 1200  Gross per 24 hour   Intake --   Output 1450 ml   Net -1450 ml       Physical Exam  Constitutional:       General: She is not in acute distress.     Appearance: Normal appearance.   HENT:      Head: Normocephalic and atraumatic.      Nose: Nose normal.      Mouth/Throat:      Mouth: Mucous membranes are moist.      Pharynx: Oropharynx is clear.   Eyes:      Extraocular Movements: Extraocular movements intact.      Conjunctiva/sclera: Conjunctivae normal.      Pupils: Pupils are equal, round, and reactive to light.   Cardiovascular:      Rate and Rhythm: Normal rate and regular rhythm.      Heart sounds: Normal heart sounds. No murmur heard.  Pulmonary:      Effort: Pulmonary effort is normal.      Breath sounds: Normal breath sounds. No wheezing, rhonchi or rales.   Abdominal:      General: Bowel sounds are normal.      Palpations: Abdomen is soft.   Musculoskeletal:         General: Normal range of motion.      Cervical back: Normal range of motion and neck supple.      Right lower leg: No edema.      Comments: LLE bandaged in splint   Skin:     General: Skin is warm and dry.   Neurological:      General: No focal deficit present.      Mental Status: She is alert and oriented to person, place, and time. Mental status is at baseline.   Psychiatric:         Mood and Affect: Mood normal.         Behavior: Behavior normal.         Thought Content: Thought content normal.          Judgment: Judgment normal.          Recent Labs   Lab 08/07/22  0218 08/08/22  0334 08/09/22  0336   WBC 9.4 6.8 7.6   HGB 12.8 11.5* 10.9*   HCT 39.0 36.1* 34.3*    284 249   MCV 94.4* 95.5* 97.2*   PTT 24.1  --   --    INR 1.08  --   --         Recent Labs   Lab 08/08/22  0334 08/09/22  0336    137   K 4.7 5.2*   CHLORIDE 104 104   CO2 25 25   BUN 18.0 11.0   CREATININE 0.82 0.78   GLUCOSE 114 130*   MG 2.10 2.00   PHOS  --  2.4   CALCIUM 9.6 9.6   ALBUMIN 3.4 3.2*   BILITOT 0.5  --    ALKPHOS 81  --    ALT 31  --    AST 21  --            ASSESSMENT/PLAN:     Patient Active Problem List    Diagnosis Date Noted    Closed trimalleolar fracture of right ankle 08/07/2022    Depression     Hyperlipidemia 08/07/2022    Hypertension 08/07/2022    Angina pectoris 08/07/2022        - s/p ORIF Right Trimalleolar Fx  - add Lopressor 25mg BID  - cont Lisinopril 10mg QD  - analgesia prn  - NWB  - Ortho recs  - awaiting inpt rehab placement  - am labs  - PT  - DVT ppx with ASA 325mg daily        VTE Risk Mitigation (From admission, onward)         Ordered     IP VTE LOW RISK PATIENT  Once         08/07/22 1215     Place sequential compression device  Until discontinued         08/07/22 1215                       Ranjeet Tamayo MD  [facility] Medicine   [facility]   Concern for injury to penis

## 2022-08-10 NOTE — PT/OT/SLP PROGRESS
Physical Therapy Treatment    Patient Name:  Alicia Bhagat   MRN:  [id number]    Recommendations:     Discharge Recommendations:  rehabilitation facility   Discharge Equipment Recommendations: walker, standard, wheelchair   Barriers to discharge: None    Assessment:     Alicia Bhagat is a 62 y.o. female admitted with a medical diagnosis of Closed trimalleolar fracture of right ankle.  She presents with the following impairments/functional limitations:  orthopedic precautions, pain, impaired balance, gait instability, weakness.    Pt able to increase gait, tolerated 50' and another 35' with RW and CGA. She does well maintaining NWB to RLE. She is making good gains however will benefit from aggressive therapy in a rehab setting prior to returning home. Pt was I prior and will have a difficult time with mobility, transfers, and ADL's at home.    Rehab Prognosis: Good; patient would benefit from acute skilled PT services to address these deficits and reach maximum level of function.    Recent Surgery: Procedure(s) (LRB):  ORIF, ANKLE (Right) 2 Days Post-Op    Plan:     During this hospitalization, patient to be seen BID to address the identified rehab impairments via gait training, therapeutic activities, therapeutic exercises and progress toward the following goals:    · Plan of Care Expires:  09/05/22    Subjective     Chief Complaint: pain  Patient/Family Comments/goals: to be able to return home  Pain/Comfort:         Objective:     Communicated with patient prior to session.  Patient found HOB elevated with peripheral IV, PureWick upon PT entry to room.     General Precautions: Standard, fall   Orthopedic Precautions:RLE non weight bearing   Braces:    Respiratory Status: Room air     Functional Mobility:  · Bed Mobility:     · Supine to Sit: stand by assistance  · Transfers:     · Sit to Stand:  stand by assistance with rolling walker  · Gait: 50' and 35' min-CGA with RW, NWB RLE  · Balance: min A in supported  standiong      AM-PAC 6 CLICK MOBILITY          Therapeutic Activities and Exercises:   see above    Patient left up in chair with all lines intact and call button in reach..    GOALS:   Multidisciplinary Problems     Physical Therapy Goals        Problem: Physical Therapy    Goal Priority Disciplines Outcome Goal Variances Interventions   Physical Therapy Goal     PT, PT/OT Ongoing, Progressing     Description: Goals to be met by: 22     Patient will increase functional independence with mobility by performin. Supine to sit with Contact Guard Assistance  2. Sit to stand transfer with Contact Guard Assistance  3. Wheelchair propulsion x500 feet with Supervision using bilateral uppper extremities                     Time Tracking:     PT Received On: 08/10/22  PT Start Time: 900  PT Stop Time:920  PT Total Time (min): 20 min     Billable Minutes: Therapeutic Activity 20    Treatment Type: Treatment  PT/PTA: PTA           08/10/2022

## 2022-08-10 NOTE — PLAN OF CARE
Pt is approved and accepted to go to [facility] today.  Faxed d/c med list and spoke to Hetal Fong.  They will send van to pick pt up.  Hetal Fong will call me with name of nurse we call report to.

## 2022-08-10 NOTE — DISCHARGE SUMMARY
[facility] Surgical Unit  Hospital Medicine  Discharge Summary      Patient Name: Alicia Bhagat  MRN: [id number]  Patient Class: IP- Inpatient  Admission Date: 8/7/2022  1:46 AM  Hospital Length of Stay: 3 days  Discharge Date and Time:  08/10/2022 1:53 PM  Attending Physician: Ranjeet Tamayo MD   Discharging Provider: Ranjeet Tamayo MD  Primary Care Provider: Albaro Bonilla MD      HPI:   63 yo female presents to the ED last night after sustaining a fall in which she did strike her head but no LOC and twisted her right ankle.  She was at the [employer] drinking Etoh.  On her way out she tripped and fell.  She was found to have a displaced trimalleolar fracture of the right ankle.  She was also intoxicated with a Etoh level of 283.  Pain is 9/10 in intensity with no relieving factors.  She denies any N/V/D/C.  No chest pain/SOB.  No fever/chills.  Orthopedics has been consulted and repair is scheduled for tomorrow.      Procedure(s) (LRB):  ORIF, ANKLE (Right)  (8-8-2022)      Hospital Course:   Admitted to Hospital Medicine service. Ortho consulted. Pt underwent ORIF by  w/o complications. She has been seen by PT. [facility].          Consults:   Consults (From admission, onward)        Status Ordering Provider     Inpatient consult to Social Work/Case Management  Once        Provider:  (Not yet assigned)    Acknowledged DREW POPE     Inpatient consult to Orthopedic Surgery  Once        Provider:  Vitaliy Lundy MD    Acknowledged DREW POPE            Final Active Diagnoses:    Diagnosis Date Noted POA    Hypertension [I10] 08/07/2022 Yes     Chronic      Problems Resolved During this Admission:    Diagnosis Date Noted Date Resolved POA    PRINCIPAL PROBLEM:  Closed trimalleolar fracture of right ankle [S82.851A] 08/07/2022 08/10/2022 Yes    Fall [W19.XXXA] 08/09/2022 08/10/2022 Yes     Alcoholic intoxication with complication [F10.929] 08/09/2022 08/10/2022 Yes       Discharged Condition: good    Disposition: Rehab Facility    Follow Up:   Follow-up Information     Vitaliy Lundy MD Follow up on 8/24/2022.    Specialty: Orthopedic Surgery  Why: @ 1:30  Contact information:  [address]  [place] [address]  [phone]             Albaro Bonilla MD Follow up.    Specialty: Family Medicine  Why: after discharge from Rehab; call to make appointment  Contact information:  [address]  [address] [address]  [phone]                       Patient Instructions:      Diet Cardiac     Diet Adult Regular     Weight bearing restrictions (specify):   Order Comments: NWB to LLE       Significant Diagnostic Studies: Labs:   CMP   Recent Labs   Lab 08/09/22  0336 08/10/22  0357    138   K 5.2* 4.9   CO2 25 27   BUN 11.0 10.0   CREATININE 0.78 0.74   CALCIUM 9.6 9.8   ALBUMIN 3.2*  --    , CBC   Recent Labs   Lab 08/09/22  0336 08/10/22  0357   WBC 7.6 5.7   HGB 10.9* 11.5*   HCT 34.3* 36.7*    256    and All labs within the past 24 hours have been reviewed    SURG FL Surgery Fluoro Usage  See OP Notes for results.     IMPRESSION: See OP Notes for results.     This procedure was auto-finalized by: Virtual Radiologist  X-Ray Chest 1 View  Narrative: EXAMINATION:  XR CHEST 1 VIEW    CLINICAL HISTORY:  pre-op;, .    COMPARISON:  09/18/2015    FINDINGS:  An AP view or more reveals the heart to be mildly enlarged.  The trachea is to the right of midline.  No infiltrate or effusion is seen.  There is mild elevation of the right hemidiaphragm.  Impression: 1. Mild cardiomegaly  2. Elevated right hemidiaphragm  3. Thoracic spondylosis    Electronically signed by: Dragan Abbott  Date:    08/08/2022  Time:    09:49        Physical Exam  Constitutional:       General: She is not in acute distress.     Appearance: Normal appearance.   HENT:      Head: Normocephalic and atraumatic.      Nose:  Nose normal.      Mouth/Throat:      Mouth: Mucous membranes are moist.      Pharynx: Oropharynx is clear.   Eyes:      Extraocular Movements: Extraocular movements intact.      Conjunctiva/sclera: Conjunctivae normal.      Pupils: Pupils are equal, round, and reactive to light.   Cardiovascular:      Rate and Rhythm: Normal rate and regular rhythm.      Heart sounds: Normal heart sounds. No murmur heard.  Pulmonary:      Effort: Pulmonary effort is normal.      Breath sounds: Normal breath sounds. No wheezing, rhonchi or rales.   Abdominal:      General: Bowel sounds are normal.      Palpations: Abdomen is soft.   Musculoskeletal:         General: Normal range of motion.      Cervical back: Normal range of motion and neck supple.      Right lower leg: No edema.      Comments: LLE bandaged in splint   Skin:     General: Skin is warm and dry.   Neurological:      General: No focal deficit present.      Mental Status: She is alert and oriented to person, place, and time. Mental status is at baseline.   Psychiatric:         Mood and Affect: Mood normal.         Behavior: Behavior normal.         Thought Content: Thought content normal.         Judgment: Judgment normal.            Pending Diagnostic Studies:     None         Medications:  Reconciled Home Medications:      Medication List      START taking these medications    aspirin 325 MG EC tablet  Commonly known as: ECOTRIN  Take 1 tablet (325 mg total) by mouth once daily.     cefadroxil 500 MG Cap  Commonly known as: DURICEF  Take 1 capsule (500 mg total) by mouth every 12 (twelve) hours. for 10 days     HYDROcodone-acetaminophen  mg per tablet  Commonly known as: NORCO  Take 1 tablet by mouth every 6 (six) hours as needed for Pain.     metoprolol tartrate 25 MG tablet  Commonly known as: LOPRESSOR  Take 1 tablet (25 mg total) by mouth 2 (two) times daily.        CONTINUE taking these medications    atorvastatin 40 MG tablet  Commonly known as:  LIPITOR  Take 40 mg by mouth once daily.     diphenhydrAMINE 25 mg tablet  Commonly known as: SOMINEX  Take 50 mg by mouth nightly as needed for Insomnia.     ergocalciferol 50,000 unit Cap  Commonly known as: ERGOCALCIFEROL  Take 50,000 Units by mouth every 7 days.     EScitalopram oxalate 20 MG tablet  Commonly known as: LEXAPRO  Take 20 mg by mouth once daily.     lisinopriL 10 MG tablet  Take 10 mg by mouth once daily.            Indwelling Lines/Drains at time of discharge:   Lines/Drains/Airways     None                 Time spent on the discharge of patient: 32 minutes         Ranjeet Tamayo MD  Department of Hospital Medicine  [facility]

## 2022-08-10 NOTE — HOSPITAL COURSE
Admitted to Hospital Medicine service. Ortho consulted. Pt underwent ORIF w/o complications. She has been seen by PT. She is discharged in stable condition to [facility] to [facility] therapy.

## 2022-10-21 ENCOUNTER — HOSPITAL ENCOUNTER (EMERGENCY)
Facility: HOSPITAL | Age: 62
Discharge: HOME | End: 2022-10-21
Attending: EMERGENCY MEDICINE
Payer: MEDICAID

## 2022-10-21 VITALS
TEMPERATURE: 99 F | OXYGEN SATURATION: 98 % | DIASTOLIC BLOOD PRESSURE: 78 MMHG | HEART RATE: 114 BPM | HEIGHT: 64 IN | WEIGHT: 216 LBS | RESPIRATION RATE: 20 BRPM | SYSTOLIC BLOOD PRESSURE: 155 MMHG | BODY MASS INDEX: 36.88 KG/M2

## 2022-10-21 DIAGNOSIS — K52.9 COLITIS: Primary | ICD-10-CM

## 2022-10-21 LAB
ALBUMIN SERPL-MCNC: 3.8 GM/DL (ref 3.4–4.8)
ALBUMIN/GLOB SERPL: 1.1 RATIO (ref 1.1–2)
ALP SERPL-CCNC: 113 UNIT/L (ref 40–150)
ALT SERPL-CCNC: 42 UNIT/L (ref 0–55)
APPEARANCE UR: CLEAR
AST SERPL-CCNC: 23 UNIT/L (ref 5–34)
BACTERIA #/AREA URNS AUTO: ABNORMAL /HPF
BASOPHILS # BLD AUTO: 0.06 X10(3)/MCL (ref 0–0.2)
BASOPHILS NFR BLD AUTO: 0.5 %
BILIRUB UR QL STRIP.AUTO: NEGATIVE MG/DL
BILIRUBIN DIRECT+TOT PNL SERPL-MCNC: 0.6 MG/DL
BUN SERPL-MCNC: 8 MG/DL (ref 9.8–20.1)
CALCIUM SERPL-MCNC: 10.2 MG/DL (ref 8.4–10.2)
CHLORIDE SERPL-SCNC: 102 MMOL/L (ref 98–107)
CO2 SERPL-SCNC: 24 MMOL/L (ref 23–31)
COLOR UR AUTO: YELLOW
CREAT SERPL-MCNC: 0.92 MG/DL (ref 0.55–1.02)
EOSINOPHIL # BLD AUTO: 0.21 X10(3)/MCL (ref 0–0.9)
EOSINOPHIL NFR BLD AUTO: 1.6 %
ERYTHROCYTE [DISTWIDTH] IN BLOOD BY AUTOMATED COUNT: 13.2 % (ref 11.5–17)
GFR SERPLBLD CREATININE-BSD FMLA CKD-EPI: >60 MLS/MIN/1.73/M2
GLOBULIN SER-MCNC: 3.5 GM/DL (ref 2.4–3.5)
GLUCOSE SERPL-MCNC: 173 MG/DL (ref 82–115)
GLUCOSE UR QL STRIP.AUTO: 500 MG/DL
HCT VFR BLD AUTO: 41.4 % (ref 37–47)
HGB BLD-MCNC: 13.8 GM/DL (ref 12–16)
IMM GRANULOCYTES # BLD AUTO: 0.14 X10(3)/MCL (ref 0–0.04)
IMM GRANULOCYTES NFR BLD AUTO: 1.1 %
KETONES UR QL STRIP.AUTO: NEGATIVE MG/DL
LEUKOCYTE ESTERASE UR QL STRIP.AUTO: NEGATIVE UNIT/L
LYMPHOCYTES # BLD AUTO: 1.22 X10(3)/MCL (ref 0.6–4.6)
LYMPHOCYTES NFR BLD AUTO: 9.3 %
MCH RBC QN AUTO: 30.8 PG (ref 27–31)
MCHC RBC AUTO-ENTMCNC: 33.3 MG/DL (ref 33–36)
MCV RBC AUTO: 92.4 FL (ref 80–94)
MONOCYTES # BLD AUTO: 0.93 X10(3)/MCL (ref 0.1–1.3)
MONOCYTES NFR BLD AUTO: 7.1 %
MUCOUS THREADS URNS QL MICRO: ABNORMAL /LPF
NEUTROPHILS # BLD AUTO: 10.6 X10(3)/MCL (ref 2.1–9.2)
NEUTROPHILS NFR BLD AUTO: 80.4 %
NITRITE UR QL STRIP.AUTO: NEGATIVE
PH UR STRIP.AUTO: 5 [PH]
PLATELET # BLD AUTO: 393 X10(3)/MCL (ref 130–400)
PMV BLD AUTO: 8.5 FL (ref 7.4–10.4)
POTASSIUM SERPL-SCNC: 3.6 MMOL/L (ref 3.5–5.1)
PROT SERPL-MCNC: 7.3 GM/DL (ref 5.8–7.6)
PROT UR QL STRIP.AUTO: NEGATIVE MG/DL
RBC # BLD AUTO: 4.48 X10(6)/MCL (ref 4.2–5.4)
RBC #/AREA URNS AUTO: ABNORMAL /HPF
RBC UR QL AUTO: ABNORMAL UNIT/L
SODIUM SERPL-SCNC: 134 MMOL/L (ref 136–145)
SP GR UR STRIP.AUTO: 1.01
SQUAMOUS #/AREA URNS AUTO: ABNORMAL /HPF
UROBILINOGEN UR STRIP-ACNC: 0.2 MG/DL
WBC # SPEC AUTO: 13.2 X10(3)/MCL (ref 4.5–11.5)
WBC #/AREA URNS AUTO: ABNORMAL /HPF

## 2022-10-21 PROCEDURE — 63600175 PHARM REV CODE 636 W HCPCS: Performed by: NURSE PRACTITIONER

## 2022-10-21 PROCEDURE — 99285 EMERGENCY DEPT VISIT HI MDM: CPT | Mod: 25

## 2022-10-21 PROCEDURE — 25000003 PHARM REV CODE 250: Performed by: NURSE PRACTITIONER

## 2022-10-21 PROCEDURE — 80053 COMPREHEN METABOLIC PANEL: CPT | Performed by: EMERGENCY MEDICINE

## 2022-10-21 PROCEDURE — 96361 HYDRATE IV INFUSION ADD-ON: CPT

## 2022-10-21 PROCEDURE — 25500020 PHARM REV CODE 255: Performed by: EMERGENCY MEDICINE

## 2022-10-21 PROCEDURE — 36415 COLL VENOUS BLD VENIPUNCTURE: CPT | Performed by: EMERGENCY MEDICINE

## 2022-10-21 PROCEDURE — 96374 THER/PROPH/DIAG INJ IV PUSH: CPT | Mod: 59

## 2022-10-21 PROCEDURE — 81001 URINALYSIS AUTO W/SCOPE: CPT | Performed by: EMERGENCY MEDICINE

## 2022-10-21 PROCEDURE — 85025 COMPLETE CBC W/AUTO DIFF WBC: CPT | Performed by: EMERGENCY MEDICINE

## 2022-10-21 RX ORDER — DICYCLOMINE HYDROCHLORIDE 20 MG/1
20 TABLET ORAL 2 TIMES DAILY
Qty: 24 TABLET | Refills: 0 | Status: SHIPPED | OUTPATIENT
Start: 2022-10-21 | End: 2022-11-02

## 2022-10-21 RX ORDER — METRONIDAZOLE 500 MG/1
500 TABLET ORAL EVERY 12 HOURS
Qty: 14 TABLET | Refills: 0 | Status: SHIPPED | OUTPATIENT
Start: 2022-10-21 | End: 2022-10-28

## 2022-10-21 RX ORDER — DEXAMETHASONE SODIUM PHOSPHATE 4 MG/ML
4 INJECTION, SOLUTION INTRA-ARTICULAR; INTRALESIONAL; INTRAMUSCULAR; INTRAVENOUS; SOFT TISSUE
Status: COMPLETED | OUTPATIENT
Start: 2022-10-21 | End: 2022-10-21

## 2022-10-21 RX ORDER — CIPROFLOXACIN 500 MG/1
500 TABLET ORAL 2 TIMES DAILY
Qty: 14 TABLET | Refills: 0 | Status: SHIPPED | OUTPATIENT
Start: 2022-10-21 | End: 2022-10-28

## 2022-10-21 RX ORDER — ACETAMINOPHEN 500 MG
1000 TABLET ORAL
Status: COMPLETED | OUTPATIENT
Start: 2022-10-21 | End: 2022-10-21

## 2022-10-21 RX ADMIN — SODIUM CHLORIDE 1000 ML: 9 INJECTION, SOLUTION INTRAVENOUS at 02:10

## 2022-10-21 RX ADMIN — DEXAMETHASONE SODIUM PHOSPHATE 4 MG: 4 INJECTION, SOLUTION INTRA-ARTICULAR; INTRALESIONAL; INTRAMUSCULAR; INTRAVENOUS; SOFT TISSUE at 03:10

## 2022-10-21 RX ADMIN — IOPAMIDOL 100 ML: 755 INJECTION, SOLUTION INTRAVENOUS at 02:10

## 2022-10-21 RX ADMIN — ACETAMINOPHEN 1000 MG: 500 TABLET, FILM COATED ORAL at 03:10

## 2022-10-21 NOTE — DISCHARGE INSTRUCTIONS
Take medicines as prescribed    See your family doctor in one to 2 days for further evaluation, workup, and treatment as necessary    Avoid driving or operating machinery while taking medicines as some medicines might cause drowsiness and may cause problems. Also pain medicines have potential of being addictive  so use Pain meds specially Narcotics Sparingly.    The exam and treatment you received in Emergency Room was for an urgent problem and NOT INTENDED AS COMPLETE CARE. It is important that you FOLLOW UP with a doctor for ongoing care. If your symptoms become WORSE or you DO NOT IMPROVE and you are unable to reach your health care provider, you should RETURN to the emergency department. The Emergency Room doctor has provided a PRELIMINARY INTERPRETATION of all your STUDIES. A final interpretation may be done after you are discharged. IF A CHANGE in your diagnosis or treatment is needed WE WILL CONTACT YOU. It is critical that we have a CURRENT PHONE NUMBER FOR YOU.    If your symptoms do not improve refer worsen in the next 12-24 hours to hesitate to return back to emergency department for further evaluation and treatment.  Otherwise follow-up with your primary care provider on Monday for emerged from follow-up.

## 2022-10-21 NOTE — ED NOTES
Pt presents to the ED in private vehicle with c/o diarrhea and fever. Pt states that diarrhea started about three weeks ago, but fever began yesterday and has not resolved. No other acute complaints at this time. Family at bedside, pt stable at this time.

## 2022-10-21 NOTE — ED PROVIDER NOTES
Encounter Date: 10/21/2022       History     Chief Complaint   Patient presents with    Fever    Diarrhea     Episode of LLQ abd pain 3 weeks ago   abd pain has stopped   now having fever and diarrhea     62-year-old  female presents emerged department complaints of diarrhea x3 weeks.  Patient states she did initially have some abdominal pain to the left flank however she is no longer having any pain is mainly just the diarrhea and she began with fever today of 103 so she came into the emergency department for further evaluation.  She did have 1 episode of nausea vomiting but is mainly the diarrhea that is her main problem.  Patient denies any history of diverticulitis.  Patient denies any renal stone history.  Patient has no other complaints for this ED visit today.    Review of patient's allergies indicates:   Allergen Reactions    Adhesive      Past Medical History:   Diagnosis Date    Cellulitis     Closed trimalleolar fracture of right ankle 08/07/2022    COVID-19     Depression     Disorder of bone density and structure, unspecified     High cholesterol     Hypertension     Olecranon bursitis     Radiculopathy of cervical region     Radiculopathy of cervicothoracic region     Spondylosis of cervicothoracic spine     Vitamin D deficiency      Past Surgical History:   Procedure Laterality Date    BACK SURGERY      COLONOSCOPY      OPEN REDUCTION AND INTERNAL FIXATION (ORIF) OF INJURY OF ANKLE Right 08/08/2022    Procedure: ORIF, ANKLE;  Surgeon: Vitaliy Lundy MD;  Location: [facility];  Service: Orthopedics;  Laterality: Right;     Family History   Problem Relation Age of Onset    Heart disease Mother     Coronary artery disease Mother     Lung cancer Father      Social History     Tobacco Use    Smoking status: Never    Smokeless tobacco: Never   Substance Use Topics    Alcohol use: Yes     Comment: Once a week     Review of Systems   Constitutional:  Positive for fatigue and fever. Negative for  activity change and appetite change.   HENT:  Negative for congestion, dental problem, ear discharge, sore throat and voice change.    Respiratory:  Negative for apnea, choking and shortness of breath.    Cardiovascular:  Negative for chest pain.   Gastrointestinal:  Positive for abdominal distention, abdominal pain, diarrhea, nausea and vomiting.        No abdominal pain or tenderness now this resolved.   Genitourinary:  Negative for decreased urine volume, difficulty urinating, dyspareunia, dysuria, menstrual problem and pelvic pain.   Musculoskeletal:  Positive for back pain. Negative for arthralgias.        Left flank pain.   Skin:  Negative for color change and rash.   Allergic/Immunologic: Negative for environmental allergies and food allergies.   Neurological:  Positive for headaches. Negative for dizziness, facial asymmetry, speech difficulty and weakness.   Hematological:  Does not bruise/bleed easily.   Psychiatric/Behavioral:  Negative for agitation and behavioral problems.    All other systems reviewed and are negative.    Physical Exam     Initial Vitals [10/21/22 1209]   BP Pulse Resp Temp SpO2   (!) 158/81 (!) 113 18 (!) 102.1 °F (38.9 °C) 98 %      MAP       --         Physical Exam    Nursing note and vitals reviewed.  Constitutional: Vital signs are normal. She appears well-developed and well-nourished.  Non-toxic appearance. She does not have a sickly appearance.   HENT:   Head: Normocephalic and atraumatic.   Eyes: Conjunctivae, EOM and lids are normal. Pupils are equal, round, and reactive to light. Lids are everted and swept, no foreign bodies found.   Neck: Trachea normal and phonation normal. Neck supple. No thyroid mass and no thyromegaly present.   Normal range of motion.   Full passive range of motion without pain.     Cardiovascular:  Normal rate, regular rhythm, S1 normal, S2 normal, normal heart sounds, intact distal pulses and normal pulses.           Pulmonary/Chest: Breath sounds  normal.   Abdominal: Abdomen is soft.   More hypoactive bowel sounds the upper abdomen versus the lower abdomen which has normal to hyperactive bowel sounds.  There is no tenderness on exam.  There is no rebound guarding or masses.   Musculoskeletal:         General: No tenderness or edema. Normal range of motion.      Cervical back: Full passive range of motion without pain, normal range of motion and neck supple.     Lymphadenopathy:     She has no cervical adenopathy.   Neurological: She is alert and oriented to person, place, and time. She has normal strength and normal reflexes. GCS score is 15. GCS eye subscore is 4. GCS verbal subscore is 5. GCS motor subscore is 6.   Skin: Skin is warm, dry and intact. Capillary refill takes less than 2 seconds.   Psychiatric: She has a normal mood and affect. Her speech is normal and behavior is normal. Judgment normal. Cognition and memory are normal.       ED Course   Procedures  Labs Reviewed   COMPREHENSIVE METABOLIC PANEL - Abnormal; Notable for the following components:       Result Value    Sodium Level 134 (*)     Glucose Level 173 (*)     Blood Urea Nitrogen 8.0 (*)     All other components within normal limits   URINALYSIS, REFLEX TO URINE CULTURE - Abnormal; Notable for the following components:    Glucose,  (*)     Blood, UA Small (*)     All other components within normal limits   CBC WITH DIFFERENTIAL - Abnormal; Notable for the following components:    WBC 13.2 (*)     Neut # 10.6 (*)     IG# 0.14 (*)     All other components within normal limits   URINALYSIS, MICROSCOPIC - Abnormal; Notable for the following components:    Mucous, UA Trace (*)     Squamous Epithelial Cells, UA Few (*)     All other components within normal limits   C. DIFFICILE BY RAPID PCR   CBC W/ AUTO DIFFERENTIAL    Narrative:     The following orders were created for panel order CBC Auto Differential.  Procedure                               Abnormality         Status                      ---------                               -----------         ------                     CBC with Differential[[id number]]        Abnormal            Final result                 Please view results for these tests on the individual orders.   FECAL LEUKOCYTES - LACTOFERRIN ON  STOOL   STOOL EXAM-OVA,CYSTS,PARASITES          Imaging Results              CT Abdomen Pelvis With Contrast (Final result)  Result time 10/21/22 15:22:36      Final result by Bobo Monge MD (10/21/22 15:22:36)                   Impression:      Distal colitis/proctitis.      Electronically signed by: Bobo Monge  Date:    10/21/2022  Time:    15:22               Narrative:    EXAMINATION:  CT ABDOMEN PELVIS WITH CONTRAST    CLINICAL HISTORY:  Abdominal abscess/infection suspected;    TECHNIQUE:  CT imaging of the abdomen and pelvis after the administration of 100 mL of Isovue 370 intravenous contrast.  Oral contrast not administered. Dose length product 603 mGycm. Automatic exposure control, adjustment of mA/kV or iterative reconstruction technique used to limit radiation dose.    COMPARISON:  No relevant comparison studies available at the time of dictation.    FINDINGS:  Liver/biliary: 6 mm hypodensity in the anterior left liver too small to fully assess, but statistically benign, likely tiny cyst.  Suspected cholelithiasis.  No biliary dilatation.    Pancreas: Normal.    Spleen: Normal.    Adrenals: Normal.    Genitourinary: Symmetric renal enhancement. No hydronephrosis. 10 mm right renal cyst needs no imaging follow-up.  Bladder under distended with no definitive abnormality.  No adnexal mass.    Stomach/bowel: No evidence of bowel obstruction. Normal appendix. Mild wall thickening of the sigmoid colon and rectum.    Lymph nodes/peritoneum: No pathologically enlarged lymph node identified. No ascites or free air. No fluid collection.    Vasculature: Normal abdominal aortic caliber.    Abdominal wall: Normal.    Lung bases: No  consolidation or pleural effusion.    Musculoskeletal: No acute osseous findings.                                       Medications   dexAMETHasone injection 4 mg (has no administration in time range)   sodium chloride 0.9% bolus 1,000 mL (1,000 mLs Intravenous New Bag 10/21/22 1448)   iopamidoL (ISOVUE-370) injection 100 mL (100 mLs Intravenous Given 10/21/22 1437)   acetaminophen tablet 1,000 mg (1,000 mg Oral Given 10/21/22 1531)     Medical Decision Making:   Initial Assessment:   Will start with IV fluids as well as CT of the abdomen with mildly elevated white blood cell count fever we need to rule out any infectious etiology in the abdomen including diverticulitis, appendicitis, bowel obstruction or bowel perforation.                        Clinical Impression:   Final diagnoses:  [K52.9] Colitis (Primary)      ED Disposition Condition    Discharge Stable          ED Prescriptions       Medication Sig Dispense Start Date End Date Auth. Provider    ciprofloxacin HCl (CIPRO) 500 MG tablet Take 1 tablet (500 mg total) by mouth 2 (two) times daily. for 7 days 14 tablet 10/21/2022 10/28/2022 RUTH Gage    metroNIDAZOLE (FLAGYL) 500 MG tablet Take 1 tablet (500 mg total) by mouth every 12 (twelve) hours. for 7 days 14 tablet 10/21/2022 10/28/2022 RUTH Gage    dicyclomine (BENTYL) 20 mg tablet Take 1 tablet (20 mg total) by mouth 2 (two) times daily. for 12 days 24 tablet 10/21/2022 11/2/2022 URTH Gage          Follow-up Information       Follow up With Specialties Details Why Contact Info    Albaro Bonilla MD Family Medicine Call in 1 week As needed, For ER Follow Up. [address]  [address]  [facility] [id number] [phone]               RUTH Gage  10/21/22 0606

## 2022-12-16 NOTE — OR NURSING
INFORMED FAMILY PROCEDURE STARTING   Preparing for Your Surgery      Name:  Shakila Kapoor   MRN:  [id number]   :  1969   Today's Date:  2022       Arriving for surgery:  Surgery date:  22  Arrival time:  6:00 am     Surgeries and procedures: Adult patients can have 2 visitors all through the surgery process.     Visiting hours: 8 a.m. to 8:30 p.m.     Hospital: Adult patients and children under age 18 can have 4 visitor at a time     No visitors under the age of 5 are allowed for hospital patients.  Double occupancy rooms: Patients can have only two visitors at a time.     Patients with disabilities: Can have a support person with them (family member, service provider     Or someone well informed about their needs) plus the allowed number of visitors     Patients confirmed or suspected to have symptoms of COVID 19 or flu:     No visitors allowed for adult patients.   Children (under age 18) can have 1 named visitor.     People who are sick or showing symptoms of COVID 19 or flu:    Are not allowed to visit patients--we can only make exceptions in special situations.       Please follow these guidelines for your visit:   Arrive wearing a mask over your mouth and nose; we will give you a medical mask to wear    If you arrive wearing a cloth mask.   Keep it on during your entire visit, even when in patient's room.   If you don't wear a mask we'll ask you to leave.     Clean your hands with alcohol hand . Do this when you arrive at and leave the building and patient room,    And again after you touch your mask or anything in the room.     You can t visit if you have a fever, cough, shortness of breath, muscle aches, headaches, sore throat    Or diarrhea      Stay 6 feet away from others during your visit and between visits     Go directly to and from the room you are visiting.     Stay in the patient s room during your visit. Limit going to other places in the hospital as much as possible     Leave bags and jackets at home  or in the car.     For everyone s health, please don t come and go during your visit. That includes for smoking   during your visit.     Please come to:     [facility] [address]  [address] [place]  [address]    - ? parking is available in front of the hospital     -   Parking is available in the Patient Visitor Ramp on [address] and [facility].     -   When entering the hospital you will be asked COVID screening questions, you will then be directed to Registration.  Registration will direct you to the 3rd floor Surgery waiting room.     -   Please ask if you need an escort or a wheelchair to the Surgery Waiting Room.  Preop number- [phone]        -    Please proceed to [address] on the 3rd floor. [phone]?     - ?If you are in need of directions, wheelchair or escort please stop at the Information Desk in the lobby.  Inform the information person that you are here for surgery; a wheelchair and escort to [address] will be provided.?     What can I eat or drink?  -  You may eat and drink normally up to 8 hours prior to arrival time. (Until 10:00 pm on 12/19/22)  -  You may have clear liquids until 2 hours prior to arrival time. (Until 4:00 am on 12/20/22)    Examples of clear liquids:  Water  Clear broth  Juices (apple, white grape, white cranberry  and cider) without pulp  Noncarbonated, powder based beverages  (lemonade and Gomez-Aid)  Sodas (Sprite, 7-Up, ginger ale and seltzer)  Coffee or tea (without milk or cream)  Gatorade    -  No Alcohol for at least 24 hours before surgery.     Which medicines can I take?    Hold Aspirin for 7 days before surgery.   Hold Multivitamins for 7 days before surgery.  Hold Supplements for 7 days before surgery.  Hold Ibuprofen (Advil, Motrin) for 1 day before surgery--unless otherwise directed by surgeon.  Hold Naproxen (Aleve) for 4 days before surgery.    Hold all NSAIDS for 7 days before spine  surgery. (Ibuprofen, Naproxen, Celebrex, Indocin, Diclofenac.)    -  DO NOT take these medications the day of surgery:  See above    -  PLEASE TAKE these medications the day of surgery:  Tylenol if needed  Lorazepam (Ativan) if needed  Oxybutynin to be taken at your usual time the night before surgery  Ozanimod (Zeposia) to be taken at your usual time the night before surgery  Paroxetine (Paxil) to be taken at your usual time the night before surgery    How do I prepare myself?  - Please take 2 showers before surgery using Scrubcare or Hibiclens soap.    Use this soap only from the neck to your toes.     Leave the soap on your skin for one minute--then rinse thoroughly.      You may use your own shampoo and conditioner. No other hair products.   - Please remove all jewelry and body piercings.  - No lotions, deodorants or fragrance.  - No makeup or fingernail polish.   - Bring your ID and insurance card.    Covid testing policy as of 12/06/2022  Your surgeon will notify and schedule you for a COVID test if one is needed before surgery--please direct any questions or COVID symptoms to your surgeon      Questions or Concerns:    - For any questions regarding the day of surgery or your hospital stay, please contact the Pre Admission Nursing Office at [phone].       - If you have health changes between today and your surgery, please call your surgeon.       - For questions after surgery, please call your surgeons office.

## 2024-06-26 ENCOUNTER — HOSPITAL ENCOUNTER (OUTPATIENT)
Dept: RADIOLOGY | Facility: HOSPITAL | Age: 64
Discharge: HOME | End: 2024-06-26
Attending: PEDIATRICS
Payer: MEDICARE

## 2024-06-26 DIAGNOSIS — R22.9 LOCALIZED SUPERFICIAL SWELLING, MASS, OR LUMP: ICD-10-CM

## 2024-06-26 PROCEDURE — 71046 X-RAY EXAM CHEST 2 VIEWS: CPT | Mod: TC

## 2024-09-30 ENCOUNTER — HOSPITAL ENCOUNTER (OUTPATIENT)
Dept: RADIOLOGY | Facility: HOSPITAL | Age: 64
Discharge: HOME | End: 2024-09-30
Attending: PEDIATRICS
Payer: MEDICARE

## 2024-09-30 DIAGNOSIS — M79.641 RIGHT HAND PAIN: ICD-10-CM

## 2024-09-30 PROCEDURE — 73110 X-RAY EXAM OF WRIST: CPT | Mod: TC,RT

## 2024-10-08 DIAGNOSIS — M79.641 RIGHT HAND PAIN: Primary | ICD-10-CM

## 2024-10-17 ENCOUNTER — HOSPITAL ENCOUNTER (OUTPATIENT)
Dept: RADIOLOGY | Facility: HOSPITAL | Age: 64
Discharge: HOME | End: 2024-10-17
Attending: PEDIATRICS
Payer: MEDICARE

## 2024-10-17 DIAGNOSIS — M79.641 RIGHT HAND PAIN: ICD-10-CM

## 2024-10-17 PROCEDURE — 73218 MRI UPPER EXTREMITY W/O DYE: CPT | Mod: TC,RT

## (undated) DEVICE — GLOVE PROTEXIS HYDROGEL SZ7.5

## (undated) DEVICE — Device

## (undated) DEVICE — PADDING CAST 6IN DELTA ROLL

## (undated) DEVICE — GLOVE PROTEXIS BLUE LATEX 8.5

## (undated) DEVICE — DRESSING XEROFORM 1X8IN

## (undated) DEVICE — DRAPE T EXTRM SURG 121X128X90

## (undated) DEVICE — WRAP COBAN NL STRL 4INX5YD

## (undated) DEVICE — TOURNIQUET SB QC DP 34X4IN

## (undated) DEVICE — BANDAGE ACE DOUBLE STER 6IN

## (undated) DEVICE — BANDAGE ESMARK ELASTIC ST 6X9

## (undated) DEVICE — GLOVE PROTEXIS BLUE LATEX 7

## (undated) DEVICE — BIT DRILL 3.5MM

## (undated) DEVICE — SPLINT PLASTER FAST SET 5X30IN

## (undated) DEVICE — BLADE SURG CARBON STEEL #10

## (undated) DEVICE — DRAPE MOBILE C-ARM

## (undated) DEVICE — DURAPREP SURG SCRUB 26ML

## (undated) DEVICE — SUT VICRYL 2-0 J589H 27IN

## (undated) DEVICE — GLOVE PROTEXIS HYDROGEL SZ8.5

## (undated) DEVICE — SPONGE DERMACEA 4X4IN 12PLY

## (undated) DEVICE — GLOVE PROTEXIS BLUE LATEX 7.5

## (undated) DEVICE — PAD ELECTROSURGICAL SPL W/CORD

## (undated) DEVICE — BIT DRILL 2.0

## (undated) DEVICE — STAPLER SKIN ROTATING HEAD

## (undated) DEVICE — BANDAGE ACE NON LATEX 3IN

## (undated) DEVICE — GLOVE PROTEXIS LTX 6.5

## (undated) DEVICE — SUPPORT ULNA NERVE PROTECTOR

## (undated) DEVICE — GOWN POLY REINF BRTH SLV XL

## (undated) DEVICE — BANDAGE COMPR DBL HOOK 4INX5YD

## (undated) DEVICE — BANDAGE ACE ELASTIC 6"

## (undated) DEVICE — TOWEL OR DISP STRL BLUE 4/PK

## (undated) DEVICE — BIT DRILL 2.5

## (undated) DEVICE — DRAPE C-ARMOR EQUIPMENT COVER

## (undated) DEVICE — TUBING MEDI-VAC 20FT 0.29IN

## (undated) DEVICE — PADDING CAST 4IN DELTA ROLL